# Patient Record
Sex: MALE | Race: WHITE | NOT HISPANIC OR LATINO | Employment: FULL TIME | URBAN - METROPOLITAN AREA
[De-identification: names, ages, dates, MRNs, and addresses within clinical notes are randomized per-mention and may not be internally consistent; named-entity substitution may affect disease eponyms.]

---

## 2018-07-10 ENCOUNTER — HOSPITAL ENCOUNTER (INPATIENT)
Facility: HOSPITAL | Age: 53
LOS: 3 days | Discharge: HOME | DRG: 909 | End: 2018-07-13
Attending: OTOLARYNGOLOGY | Admitting: OTOLARYNGOLOGY
Payer: COMMERCIAL

## 2018-07-10 DIAGNOSIS — G51.0 FACIAL NERVE PARALYSIS: ICD-10-CM

## 2018-07-10 PROCEDURE — 20600001 HC STEP DOWN PRIVATE ROOM

## 2018-07-10 PROCEDURE — 25000003 PHARM REV CODE 250: Performed by: OTOLARYNGOLOGY

## 2018-07-10 RX ORDER — PROCHLORPERAZINE EDISYLATE 5 MG/ML
5 INJECTION INTRAMUSCULAR; INTRAVENOUS EVERY 6 HOURS PRN
Status: DISCONTINUED | OUTPATIENT
Start: 2018-07-10 | End: 2018-07-13 | Stop reason: HOSPADM

## 2018-07-10 RX ORDER — ACETAMINOPHEN 325 MG/1
650 TABLET ORAL EVERY 8 HOURS PRN
Status: DISCONTINUED | OUTPATIENT
Start: 2018-07-10 | End: 2018-07-13 | Stop reason: HOSPADM

## 2018-07-10 RX ORDER — HYDROMORPHONE HYDROCHLORIDE 1 MG/ML
0.5 INJECTION, SOLUTION INTRAMUSCULAR; INTRAVENOUS; SUBCUTANEOUS EVERY 4 HOURS PRN
Status: DISCONTINUED | OUTPATIENT
Start: 2018-07-10 | End: 2018-07-13 | Stop reason: HOSPADM

## 2018-07-10 RX ORDER — BACITRACIN 500 [USP'U]/G
OINTMENT TOPICAL 3 TIMES DAILY
Status: DISCONTINUED | OUTPATIENT
Start: 2018-07-11 | End: 2018-07-13 | Stop reason: HOSPADM

## 2018-07-10 RX ORDER — AMOXICILLIN 250 MG
1 CAPSULE ORAL 2 TIMES DAILY
Status: DISCONTINUED | OUTPATIENT
Start: 2018-07-10 | End: 2018-07-13 | Stop reason: HOSPADM

## 2018-07-10 RX ORDER — HYDROCODONE BITARTRATE AND ACETAMINOPHEN 5; 325 MG/1; MG/1
1 TABLET ORAL EVERY 4 HOURS PRN
Status: DISCONTINUED | OUTPATIENT
Start: 2018-07-10 | End: 2018-07-13 | Stop reason: HOSPADM

## 2018-07-10 RX ORDER — ONDANSETRON 2 MG/ML
4 INJECTION INTRAMUSCULAR; INTRAVENOUS EVERY 8 HOURS PRN
Status: DISCONTINUED | OUTPATIENT
Start: 2018-07-10 | End: 2018-07-13 | Stop reason: HOSPADM

## 2018-07-10 RX ADMIN — SENNOSIDES AND DOCUSATE SODIUM 1 TABLET: 8.6; 5 TABLET ORAL at 11:07

## 2018-07-11 ENCOUNTER — ANESTHESIA EVENT (OUTPATIENT)
Dept: SURGERY | Facility: HOSPITAL | Age: 53
DRG: 909 | End: 2018-07-11
Payer: COMMERCIAL

## 2018-07-11 PROCEDURE — 20600001 HC STEP DOWN PRIVATE ROOM

## 2018-07-11 PROCEDURE — 63600175 PHARM REV CODE 636 W HCPCS: Performed by: OTOLARYNGOLOGY

## 2018-07-11 PROCEDURE — 25000003 PHARM REV CODE 250: Performed by: OTOLARYNGOLOGY

## 2018-07-11 RX ORDER — SODIUM CHLORIDE, SODIUM LACTATE, POTASSIUM CHLORIDE, CALCIUM CHLORIDE 600; 310; 30; 20 MG/100ML; MG/100ML; MG/100ML; MG/100ML
INJECTION, SOLUTION INTRAVENOUS CONTINUOUS
Status: DISCONTINUED | OUTPATIENT
Start: 2018-07-12 | End: 2018-07-13 | Stop reason: HOSPADM

## 2018-07-11 RX ADMIN — SENNOSIDES AND DOCUSATE SODIUM 1 TABLET: 8.6; 5 TABLET ORAL at 09:07

## 2018-07-11 RX ADMIN — HYDROCODONE BITARTRATE AND ACETAMINOPHEN 1 TABLET: 5; 325 TABLET ORAL at 06:07

## 2018-07-11 RX ADMIN — HYDROMORPHONE HYDROCHLORIDE 0.5 MG: 1 INJECTION, SOLUTION INTRAMUSCULAR; INTRAVENOUS; SUBCUTANEOUS at 07:07

## 2018-07-11 RX ADMIN — HYDROMORPHONE HYDROCHLORIDE 0.5 MG: 1 INJECTION, SOLUTION INTRAMUSCULAR; INTRAVENOUS; SUBCUTANEOUS at 09:07

## 2018-07-11 RX ADMIN — BACITRACIN: 500 OINTMENT TOPICAL at 09:07

## 2018-07-11 RX ADMIN — HYPROMELLOSE 2910 2 DROP: 5 SOLUTION OPHTHALMIC at 08:07

## 2018-07-11 RX ADMIN — BACITRACIN: 500 OINTMENT TOPICAL at 02:07

## 2018-07-11 RX ADMIN — HYDROCODONE BITARTRATE AND ACETAMINOPHEN 1 TABLET: 5; 325 TABLET ORAL at 02:07

## 2018-07-11 RX ADMIN — HYDROCODONE BITARTRATE AND ACETAMINOPHEN 1 TABLET: 5; 325 TABLET ORAL at 09:07

## 2018-07-11 RX ADMIN — ACETAMINOPHEN 650 MG: 325 TABLET ORAL at 11:07

## 2018-07-11 RX ADMIN — HYPROMELLOSE 2910 2 DROP: 5 SOLUTION OPHTHALMIC at 06:07

## 2018-07-11 NOTE — HPI
53 y/o M w/ h/o GERD and a parotid mass presents as a transfer for ENT evaluation and direct admit. 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 Dr. Moreno consulted for possible reanimation/reanastomosis of the nerve. Patient currently has no complaints and denies any symptoms other than incisional pain and inability to close his eye.

## 2018-07-11 NOTE — ANESTHESIA PREPROCEDURE EVALUATION
[facility]  Anesthesia Pre-Operative Evaluation         Patient Name: Ruben Covington  YOB: 1965  MRN: [id number]    SUBJECTIVE:     Pre-operative evaluation for Procedure(s) (LRB):  MASTOIDECTOMY (Left)  EXPLORATION, NERVE (Left)     07/11/2018    Ruben Covington is a 52 y.o. male w/ a significant PMHx of GERD and a parotid mass presents as a transfer for ENT evaluation after undergoing a left superficial parotidectomy complicated by a facial nerve injury. Active smoker.     Of note, pt reports significant PONV following yesterday's surgery at OSH. Pt currently with very limited mouth opening and neck extension due to significant pain and swelling at left cervical surgical site.  Possible difficult intubation.     Patient now presents for the above procedure(s).      LDA: None documented.    Prev airway: None documented.    Drips: None documented.    Patient Active Problem List   Diagnosis    Facial nerve paralysis       Review of patient's allergies indicates:  No Known Allergies      Current Facility-Administered Medications:     acetaminophen tablet 650 mg, 650 mg, Oral, Q8H PRN, Rhys Sandoval MD, 650 mg at 07/11/18 1132    bacitracin ointment, , Topical (Top), TID, Rhys Sandoval MD    HYDROcodone-acetaminophen 5-325 mg per tablet 1 tablet, 1 tablet, Oral, Q4H PRN, Rhys Sandoval MD, 1 tablet at 07/11/18 0934    HYDROmorphone injection 0.5 mg, 0.5 mg, Intravenous, Q4H PRN, Rhys Sandoval MD, 0.5 mg at 07/11/18 0757    ondansetron injection 4 mg, 4 mg, Intravenous, Q8H PRN, Rhys Sandoval MD    prochlorperazine injection Soln 5 mg, 5 mg, Intravenous, Q6H PRN, Rhys Sandoval MD    senna-docusate 8.6-50 mg per tablet 1 tablet, 1 tablet, Oral, BID, Rhys Sandoval MD, 1 tablet at 07/11/18 0935    white petrolatum-mineral oil (SYSTANE NIGHTTIME) ophthalmic ointment, , Left Eye, QHS, Rhys Sandoval MD    No current  facility-administered medications on file prior to encounter.      No current outpatient prescriptions on file prior to encounter.       No past surgical history on file.    Social History     Social History    Marital status: Unknown     Spouse name: N/A    Number of children: N/A    Years of education: N/A     Occupational History    Not on file.     Social History Main Topics    Smoking status: Not on file    Smokeless tobacco: Not on file    Alcohol use Not on file    Drug use: Unknown    Sexual activity: Not on file     Other Topics Concern    Not on file     Social History Narrative    No narrative on file       OBJECTIVE:     Vital Signs Range (Last 24H):  Temp:  [36.8 °C (98.2 °F)-37.1 °C (98.8 °F)]   Pulse:  [81-97]   Resp:  [16-18]   BP: (113-152)/(69-78)   SpO2:  [93 %-97 %]       Significant Labs:  No results found for: WBC, HGB, HCT, PLT, CHOL, TRIG, HDL, LDLDIRECT, ALT, AST, NA, K, CL, CREATININE, BUN, CO2, TSH, PSA, INR, GLUF, HGBA1C, MICROALBUR    Diagnostic Studies: No relevant studies.    EKG: No recent studies available.    2D ECHO:  No results found for this or any previous visit.         Anesthesia Evaluation    I have reviewed the Patient Summary Reports.    I have reviewed the Nursing Notes.   I have reviewed the Medications.     Review of Systems  Anesthesia Hx:  Hx of Anesthetic complications (PONV)  History of prior surgery of interest to airway management or planning: facial plastic/reconstructive. Previous anesthesia: General Denies Family Hx of Anesthesia complications.  Personal Hx of Anesthesia complications, Post-Operative Nausea/Vomiting   Social:  Smoker    Hematology/Oncology:  Hematology Normal        EENT/Dental:EENT/Dental Normal   Cardiovascular:  Cardiovascular Normal Exercise tolerance: good     Pulmonary:  Pulmonary Normal    Renal/:  Renal/ Normal     Hepatic/GI:   GERD, well controlled    Musculoskeletal:  Musculoskeletal Normal    Endocrine:  Endocrine  Normal    Psych:  Psychiatric Normal           Physical Exam  General:  Well nourished    Airway/Jaw/Neck:  Airway Findings: (Pt unable to open mouth >2 cm 2/2 to neck swelling and pain s/p surgical cervical mass removal 7/10/18 at OSH. Unable to assess Mallampati score due to limited mouth opening. ) Mouth Opening: < 3 cm Tongue: Normal  General Airway Assessment: Adult, Possible difficult intubation  Oropharynx Findings:  TM Distance: Normal, at least 6 cm  Jaw/Neck Findings:  Neck ROM: Extension Decreased, Mod.  Neck Findings:  Left neck with sutures and drain in place. Decreased neck extension 2/2 pain and swelling.     Eyes/Ears/Nose:  EYES/EARS/NOSE FINDINGS: Normal   Dental:  Dental Findings: loose tooth    Chest/Lungs:  Chest/Lungs Clear    Heart/Vascular:  Heart Findings: Normal    Abdomen:  Abdomen Findings: Normal    Musculoskeletal:  Musculoskeletal Findings: Normal   Skin:  Skin Findings: Normal    Mental Status:  Mental Status Findings: Normal        Anesthesia Plan  Type of Anesthesia, risks & benefits discussed:  Anesthesia Type:  general  Patient's Preference:   Intra-op Monitoring Plan:   Intra-op Monitoring Plan Comments:   Post Op Pain Control Plan: multimodal analgesia, IV/PO Opioids PRN and per primary service following discharge from PACU  Post Op Pain Control Plan Comments:   Induction:   IV  Beta Blocker:  Patient is not currently on a Beta-Blocker (No further documentation required).       Informed Consent: Patient understands risks and agrees with Anesthesia plan.  Questions answered. Anesthesia consent signed with patient.  ASA Score: 2     Day of Surgery Review of History & Physical:            Ready For Surgery From Anesthesia Perspective.

## 2018-07-11 NOTE — H&P
[facility]  Otorhinolaryngology-Head & Neck Surgery  History & Physical    Patient Name: Ruben Covington  MRN: [id number]  Admission Date: 7/10/2018  Attending Physician: Avelino Moreno MD   Primary Care Provider: Primary Doctor No    Patient information was obtained from patient and spouse/SO.     Subjective:     Chief Complaint/Reason for Admission: facial paralysis    History of Present Illness: 53 y/o M w/ h/o GERD and a parotid mass presents as a transfer for ENT evaluation and direct admit. 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 Dr. Moreno consulted for possible reanimation/reanastomosis of the nerve. Patient currently has no complaints and denies any symptoms other than incisional pain and inability to close his eye.     Medications:  Continuous Infusions:  Scheduled Meds:   [START ON 7/11/2018] bacitracin   Topical (Top) TID    senna-docusate 8.6-50 mg  1 tablet Oral BID    white petrolatum-mineral oil   Left Eye QHS     PRN Meds:acetaminophen, HYDROcodone-acetaminophen, HYDROmorphone, ondansetron, prochlorperazine     No current facility-administered medications on file prior to encounter.      No current outpatient prescriptions on file prior to encounter.       Review of patient's allergies indicates:  No Known Allergies    No past medical history on file.  No past surgical history on file.  Family History     None        Social History Main Topics    Smoking status: Not on file    Smokeless tobacco: Not on file    Alcohol use Not on file    Drug use: Unknown    Sexual activity: Not on file     Review of Systems   Constitutional: Negative for chills and fever.   HENT: Negative for congestion, dental problem, ear pain and facial swelling.    Eyes: Negative for photophobia, pain and visual disturbance.   Respiratory: Negative for cough and shortness of breath.     Neurological: Positive for facial asymmetry. Negative for speech difficulty.   All other systems reviewed and are negative.    Objective:     Vital Signs (Most Recent):  Temp: 98.5 °F (36.9 °C) (07/10/18 2136)  Pulse: 89 (07/10/18 2136)  Resp: 17 (07/10/18 2136)  BP: (!) 144/78 (07/10/18 2136)  SpO2: 95 % (07/10/18 2136) Vital Signs (24h Range):  Temp:  [98.5 °F (36.9 °C)-98.8 °F (37.1 °C)] 98.5 °F (36.9 °C)  Pulse:  [89-91] 89  Resp:  [16-17] 17  SpO2:  [95 %-96 %] 95 %  BP: (135-144)/(77-78) 144/78     Weight: 85.3 kg (188 lb)  Body mass index is 29.44 kg/m².         Physical Exam  NAD  AT/NC, HB I/VI right HB VI/VI left  EOMI OU. Incomplete closure OS, no injection  Parotidectomy incision c/d/i w/ KATE in place, not holding suction  MMM, anterior tongue mobile, FOM soft  Neck soft, not TTP, normal ROM    Significant Labs:  CBC: No results for input(s): WBC, RBC, HGB, HCT, PLT, MCV, MCH, MCHC in the last 168 hours.  CMP: No results for input(s): GLU, CALCIUM, ALBUMIN, PROT, NA, K, CO2, CL, BUN, CREATININE, ALKPHOS, ALT, AST, BILITOT in the last 168 hours.    Significant Diagnostics:  None    Assessment/Plan:     Facial nerve paralysis    51 y/o M h/o GERD and parotid mass with iatrogenic left facial nerve injury on 7/10/18. Currently AFVSS, NAD. HB VI on left.    - Admit to ENT  - Eye precautions qhs  - ointment OS qhs  - Pain control PRN  - ADAT  - Activity as tolerated  - Bacitracin to incisions and drain site  - If drain not holding suction will use intermittent wall suction  - drain care  - Plan for nerve reconstruction on Thursday w/ María          VTE Risk Mitigation         Ordered     Place ANGEL hose  Until discontinued      07/10/18 2234     Place sequential compression device  Until discontinued      07/10/18 2234     IP VTE LOW RISK PATIENT  Once      07/10/18 2234          Rhys Sandoval MD  Otorhinolaryngology-Head & Neck Surgery  [facility]

## 2018-07-11 NOTE — ASSESSMENT & PLAN NOTE
51 y/o M h/o GERD and parotid mass with iatrogenic left facial nerve injury on 7/10/18. Currently AFVSS, NAD. HB VI on left.    - Admit to ENT  - Eye precautions qhs  - ointment OS qhs  - Pain control PRN  - ADAT  - Activity as tolerated  - Bacitracin to incisions and drain site  - If drain not holding suction will use intermittent wall suction  - drain care  - Plan for nerve reconstruction on Thursday w/ María

## 2018-07-11 NOTE — PLAN OF CARE
Problem: Patient Care Overview  Goal: Plan of Care Review  Outcome: Ongoing (interventions implemented as appropriate)  Plan of care reivewed, patient verbalizes understanding. AAOx4. VSS throughout shift. Pain managed with PRN meds throughout shift. Patient on regular diet. KATE drain intact, bulb not holding suction, MD aware. Patient remains free of falls/injuries. No acute distress at this time. Will continue to monitor.

## 2018-07-11 NOTE — SUBJECTIVE & OBJECTIVE
Interval History: NAEON. Wearing eye patch at night, did not get lacrilube ointment.    Medications:  Continuous Infusions:  Scheduled Meds:   bacitracin   Topical (Top) TID    senna-docusate 8.6-50 mg  1 tablet Oral BID    white petrolatum-mineral oil   Left Eye QHS     PRN Meds:acetaminophen, HYDROcodone-acetaminophen, HYDROmorphone, ondansetron, prochlorperazine     Review of patient's allergies indicates:  No Known Allergies  Objective:     Vital Signs (24h Range):  Temp:  [98.2 °F (36.8 °C)-98.8 °F (37.1 °C)] 98.7 °F (37.1 °C)  Pulse:  [81-97] 81  Resp:  [16-18] 16  SpO2:  [93 %-97 %] 95 %  BP: (113-144)/(69-78) 128/70        Lines/Drains/Airways     Drain                 Closed/Suction Drain 07/10/18 Left Neck Bulb 1 day                Physical Exam  HB VI/VI on left. HB I/VI on right  +Bell's phenomenon on left  KATE in place in left neck, not holding suction; serosanguinous drainage  Left preauricular incision c/d/i    Significant Labs:  None    Significant Diagnostics:  None

## 2018-07-11 NOTE — PROGRESS NOTES
[facility]  Otorhinolaryngology-Head & Neck Surgery  Progress Note    Subjective:     Post-Op Info:  * No surgery found *      Hospital Day: 2     Interval History: NAEON. Wearing eye patch at night, did not get lacrilube ointment.    Medications:  Continuous Infusions:  Scheduled Meds:   bacitracin   Topical (Top) TID    senna-docusate 8.6-50 mg  1 tablet Oral BID    white petrolatum-mineral oil   Left Eye QHS     PRN Meds:acetaminophen, HYDROcodone-acetaminophen, HYDROmorphone, ondansetron, prochlorperazine     Review of patient's allergies indicates:  No Known Allergies  Objective:     Vital Signs (24h Range):  Temp:  [98.2 °F (36.8 °C)-98.8 °F (37.1 °C)] 98.7 °F (37.1 °C)  Pulse:  [81-97] 81  Resp:  [16-18] 16  SpO2:  [93 %-97 %] 95 %  BP: (113-144)/(69-78) 128/70        Lines/Drains/Airways     Drain                 Closed/Suction Drain 07/10/18 Left Neck Bulb 1 day                Physical Exam  HB VI/VI on left. HB I/VI on right  +Bell's phenomenon on left  KATE in place in left neck, not holding suction; serosanguinous drainage  Left preauricular incision c/d/i    Significant Labs:  None    Significant Diagnostics:  None    Assessment/Plan:     Facial nerve paralysis    51 y/o M h/o GERD and parotid mass with iatrogenic left facial nerve injury on 7/10/18. Currently AFVSS, NAD. HB VI on left.    - Eye precautions - lacrilube at night, artificial ears QID  - Pain control PRN  - ADAT  - Activity as tolerated  - Bacitracin to incisions and drain site  - If drain not holding suction will use intermittent wall suction  - drain care  - Plan for nerve reconstruction on Thursday w/ María - will add on and get consents today.            Tavia Slaughter MD  Otorhinolaryngology-Head & Neck Surgery  [facility]

## 2018-07-11 NOTE — PROGRESS NOTES
Paged on call MD x3 about pharmacy unable to fill ointment for patient's eye. No page returned. Will continue to monitor.

## 2018-07-11 NOTE — PLAN OF CARE
Problem: Patient Care Overview  Goal: Plan of Care Review  Pt aware NPO past midnight.     Problem: Fall Risk (Adult)  Goal: Identify Related Risk Factors and Signs and Symptoms  Related risk factors and signs and symptoms are identified upon initiation of Human Response Clinical Practice Guideline (CPG)   No falls during this shift.

## 2018-07-11 NOTE — ASSESSMENT & PLAN NOTE
53 y/o M h/o GERD and parotid mass with iatrogenic left facial nerve injury on 7/10/18. Currently AFVSS, NAD. HB VI on left.    - Eye precautions - lacrilube at night, artificial ears QID  - Pain control PRN  - ADAT  - Activity as tolerated  - Bacitracin to incisions and drain site  - If drain not holding suction will use intermittent wall suction  - drain care  - Plan for nerve reconstruction on Thursday w/ María - will add on and get consents today.

## 2018-07-11 NOTE — SUBJECTIVE & OBJECTIVE
Medications:  Continuous Infusions:  Scheduled Meds:   [START ON 7/11/2018] bacitracin   Topical (Top) TID    senna-docusate 8.6-50 mg  1 tablet Oral BID    white petrolatum-mineral oil   Left Eye QHS     PRN Meds:acetaminophen, HYDROcodone-acetaminophen, HYDROmorphone, ondansetron, prochlorperazine     No current facility-administered medications on file prior to encounter.      No current outpatient prescriptions on file prior to encounter.       Review of patient's allergies indicates:  No Known Allergies    No past medical history on file.  No past surgical history on file.  Family History     None        Social History Main Topics    Smoking status: Not on file    Smokeless tobacco: Not on file    Alcohol use Not on file    Drug use: Unknown    Sexual activity: Not on file     Review of Systems   Constitutional: Negative for chills and fever.   HENT: Negative for congestion, dental problem, ear pain and facial swelling.    Eyes: Negative for photophobia, pain and visual disturbance.   Respiratory: Negative for cough and shortness of breath.    Neurological: Positive for facial asymmetry. Negative for speech difficulty.   All other systems reviewed and are negative.    Objective:     Vital Signs (Most Recent):  Temp: 98.5 °F (36.9 °C) (07/10/18 2136)  Pulse: 89 (07/10/18 2136)  Resp: 17 (07/10/18 2136)  BP: (!) 144/78 (07/10/18 2136)  SpO2: 95 % (07/10/18 2136) Vital Signs (24h Range):  Temp:  [98.5 °F (36.9 °C)-98.8 °F (37.1 °C)] 98.5 °F (36.9 °C)  Pulse:  [89-91] 89  Resp:  [16-17] 17  SpO2:  [95 %-96 %] 95 %  BP: (135-144)/(77-78) 144/78     Weight: 85.3 kg (188 lb)  Body mass index is 29.44 kg/m².         Physical Exam  NAD  AT/NC, HB I/VI right HB VI/VI left  EOMI OU. Incomplete closure OS, no injection  Parotidectomy incision c/d/i w/ KATE in place, not holding suction  MMM, anterior tongue mobile, FOM soft  Neck soft, not TTP, normal ROM    Significant Labs:  CBC: No results for input(s): WBC, RBC,  HGB, HCT, PLT, MCV, MCH, MCHC in the last 168 hours.  CMP: No results for input(s): GLU, CALCIUM, ALBUMIN, PROT, NA, K, CO2, CL, BUN, CREATININE, ALKPHOS, ALT, AST, BILITOT in the last 168 hours.    Significant Diagnostics:  None

## 2018-07-11 NOTE — PLAN OF CARE
Patient is a 52 year old male admitted in transfer from [facility] in [place] 7/10/2018 with Fascial Nerve Injury/Paralysis.  Patient is expected to go to OR and discharge home with no needs +/- 7/13/2018.      Patient lives in a one story home with his wife, Jenifer, who will drive him home, care for him and obtain anything he needs after discharge.  Patient alone at the time of visit, states his PCP is Dr Knapp in [place], [place].  This is an ENT MD, attempt to call wife for clarification with no answer, will follow up.  Patient given [facility] with understanding verbalized.  Will continue to follow for needs.    PCP      [facility] [address] [place] [address], [place] - [address]  [facility] [address]  [place] [address]  Phone: [phone] Fax: [phone]      Extended Emergency Contact Information  Primary Emergency Contact: Jenifer Covington  Address: [address] [place] [address] [facility]  Home Phone: [phone]  Mobile Phone: [phone]  Relation: Spouse       07/11/18 1045   Discharge Assessment   Assessment Type Discharge Planning Assessment   Confirmed/corrected address and phone number on facesheet? Yes   Assessment information obtained from? Patient   Expected Length of Stay (days) 4   Communicated expected length of stay with patient/caregiver yes   Prior to hospitilization cognitive status: Alert/Oriented   Prior to hospitalization functional status: Independent   Current cognitive status: Alert/Oriented   Current Functional Status: Independent   Lives With spouse   Able to Return to Prior Arrangements yes   Is patient able to care for self after discharge? Yes   Who are your caregiver(s) and their phone number(s)? wife   Patient's perception of discharge disposition home or selfcare   Equipment Currently Used at Home none   Do you have any problems affording any of your prescribed medications? No   Is the patient taking medications as prescribed? yes    Does the patient have transportation home? Yes   Transportation Available family or friend will provide   Discharge Plan A Home with family   Discharge Plan B Home with family;Home Health   Patient/Family In Agreement With Plan yes

## 2018-07-12 ENCOUNTER — ANESTHESIA (OUTPATIENT)
Dept: SURGERY | Facility: HOSPITAL | Age: 53
DRG: 909 | End: 2018-07-12
Payer: COMMERCIAL

## 2018-07-12 ENCOUNTER — SURGERY (OUTPATIENT)
Age: 53
End: 2018-07-12

## 2018-07-12 PROCEDURE — 63600175 PHARM REV CODE 636 W HCPCS: Performed by: OTOLARYNGOLOGY

## 2018-07-12 PROCEDURE — 67912 CORRECTION EYELID W/IMPLANT: CPT | Mod: LT,,, | Performed by: OTOLARYNGOLOGY

## 2018-07-12 PROCEDURE — 37000009 HC ANESTHESIA EA ADD 15 MINS: Performed by: OTOLARYNGOLOGY

## 2018-07-12 PROCEDURE — 25000003 PHARM REV CODE 250: Performed by: OTOLARYNGOLOGY

## 2018-07-12 PROCEDURE — 36000708 HC OR TIME LEV III 1ST 15 MIN: Performed by: OTOLARYNGOLOGY

## 2018-07-12 PROCEDURE — 64864 REPAIR OF FACIAL NERVE: CPT | Mod: ,,, | Performed by: OTOLARYNGOLOGY

## 2018-07-12 PROCEDURE — 71000039 HC RECOVERY, EACH ADD'L HOUR: Performed by: OTOLARYNGOLOGY

## 2018-07-12 PROCEDURE — 37000008 HC ANESTHESIA 1ST 15 MINUTES: Performed by: OTOLARYNGOLOGY

## 2018-07-12 PROCEDURE — D9220A PRA ANESTHESIA: Mod: CRNA,,, | Performed by: NURSE ANESTHETIST, CERTIFIED REGISTERED

## 2018-07-12 PROCEDURE — D9220A PRA ANESTHESIA: Mod: ANES,,, | Performed by: ANESTHESIOLOGY

## 2018-07-12 PROCEDURE — 63600175 PHARM REV CODE 636 W HCPCS: Performed by: NURSE ANESTHETIST, CERTIFIED REGISTERED

## 2018-07-12 PROCEDURE — 64716 REVISION OF CRANIAL NERVE: CPT | Mod: 59,,, | Performed by: OTOLARYNGOLOGY

## 2018-07-12 PROCEDURE — 64910 NERVE REPAIR W/ALLOGRAFT: CPT | Mod: 59,,, | Performed by: OTOLARYNGOLOGY

## 2018-07-12 PROCEDURE — 27100025 HC TUBING, SET FLUID WARMER: Performed by: NURSE ANESTHETIST, CERTIFIED REGISTERED

## 2018-07-12 PROCEDURE — 36000709 HC OR TIME LEV III EA ADD 15 MIN: Performed by: OTOLARYNGOLOGY

## 2018-07-12 PROCEDURE — 25000003 PHARM REV CODE 250: Performed by: NURSE ANESTHETIST, CERTIFIED REGISTERED

## 2018-07-12 PROCEDURE — 27201423 OPTIME MED/SURG SUP & DEVICES STERILE SUPPLY: Performed by: OTOLARYNGOLOGY

## 2018-07-12 PROCEDURE — C1729 CATH, DRAINAGE: HCPCS | Performed by: OTOLARYNGOLOGY

## 2018-07-12 PROCEDURE — 27800903 OPTIME MED/SURG SUP & DEVICES OTHER IMPLANTS: Performed by: OTOLARYNGOLOGY

## 2018-07-12 PROCEDURE — 71000033 HC RECOVERY, INTIAL HOUR: Performed by: OTOLARYNGOLOGY

## 2018-07-12 PROCEDURE — 00UM0KZ SUPPLEMENT FACIAL NERVE WITH NONAUTOLOGOUS TISSUE SUBSTITUTE, OPEN APPROACH: ICD-10-PCS | Performed by: OTOLARYNGOLOGY

## 2018-07-12 PROCEDURE — 94761 N-INVAS EAR/PLS OXIMETRY MLT: CPT

## 2018-07-12 PROCEDURE — 08UP0JZ SUPPLEMENT LEFT UPPER EYELID WITH SYNTHETIC SUBSTITUTE, OPEN APPROACH: ICD-10-PCS | Performed by: OTOLARYNGOLOGY

## 2018-07-12 PROCEDURE — 20600001 HC STEP DOWN PRIVATE ROOM

## 2018-07-12 DEVICE — IMPLANTABLE DEVICE: Type: IMPLANTABLE DEVICE | Site: EYE | Status: FUNCTIONAL

## 2018-07-12 DEVICE — IMPLANTABLE DEVICE: Type: IMPLANTABLE DEVICE | Site: NECK | Status: FUNCTIONAL

## 2018-07-12 DEVICE — CONNECTOR NRV AXOGUARD 3X15MM: Type: IMPLANTABLE DEVICE | Site: NECK | Status: FUNCTIONAL

## 2018-07-12 RX ORDER — BACITRACIN 500 [USP'U]/G
OINTMENT TOPICAL 3 TIMES DAILY
Status: CANCELLED | OUTPATIENT
Start: 2018-07-12

## 2018-07-12 RX ORDER — MIDAZOLAM HYDROCHLORIDE 1 MG/ML
INJECTION, SOLUTION INTRAMUSCULAR; INTRAVENOUS
Status: DISCONTINUED | OUTPATIENT
Start: 2018-07-12 | End: 2018-07-12

## 2018-07-12 RX ORDER — KETAMINE HCL IN 0.9 % NACL 50 MG/5 ML
SYRINGE (ML) INTRAVENOUS
Status: DISCONTINUED | OUTPATIENT
Start: 2018-07-12 | End: 2018-07-12

## 2018-07-12 RX ORDER — FENTANYL CITRATE 50 UG/ML
INJECTION, SOLUTION INTRAMUSCULAR; INTRAVENOUS
Status: DISCONTINUED | OUTPATIENT
Start: 2018-07-12 | End: 2018-07-12

## 2018-07-12 RX ORDER — SUCCINYLCHOLINE CHLORIDE 20 MG/ML
INJECTION INTRAMUSCULAR; INTRAVENOUS
Status: DISCONTINUED | OUTPATIENT
Start: 2018-07-12 | End: 2018-07-12

## 2018-07-12 RX ORDER — LIDOCAINE HYDROCHLORIDE AND EPINEPHRINE 10; 10 MG/ML; UG/ML
INJECTION, SOLUTION INFILTRATION; PERINEURAL
Status: DISCONTINUED | OUTPATIENT
Start: 2018-07-12 | End: 2018-07-12 | Stop reason: HOSPADM

## 2018-07-12 RX ORDER — ONDANSETRON 2 MG/ML
4 INJECTION INTRAMUSCULAR; INTRAVENOUS DAILY PRN
Status: DISCONTINUED | OUTPATIENT
Start: 2018-07-12 | End: 2018-07-12 | Stop reason: HOSPADM

## 2018-07-12 RX ORDER — GLYCOPYRROLATE 0.2 MG/ML
INJECTION INTRAMUSCULAR; INTRAVENOUS
Status: DISCONTINUED | OUTPATIENT
Start: 2018-07-12 | End: 2018-07-12

## 2018-07-12 RX ORDER — CEFAZOLIN SODIUM 1 G/3ML
INJECTION, POWDER, FOR SOLUTION INTRAMUSCULAR; INTRAVENOUS
Status: DISCONTINUED | OUTPATIENT
Start: 2018-07-12 | End: 2018-07-12

## 2018-07-12 RX ORDER — DEXAMETHASONE SODIUM PHOSPHATE 4 MG/ML
INJECTION, SOLUTION INTRA-ARTICULAR; INTRALESIONAL; INTRAMUSCULAR; INTRAVENOUS; SOFT TISSUE
Status: DISCONTINUED | OUTPATIENT
Start: 2018-07-12 | End: 2018-07-12

## 2018-07-12 RX ORDER — SODIUM CHLORIDE 9 MG/ML
INJECTION, SOLUTION INTRAVENOUS CONTINUOUS PRN
Status: DISCONTINUED | OUTPATIENT
Start: 2018-07-12 | End: 2018-07-12

## 2018-07-12 RX ORDER — ACETAMINOPHEN 10 MG/ML
INJECTION, SOLUTION INTRAVENOUS
Status: DISCONTINUED | OUTPATIENT
Start: 2018-07-12 | End: 2018-07-12

## 2018-07-12 RX ORDER — HEPARIN SODIUM 1000 [USP'U]/ML
INJECTION, SOLUTION INTRAVENOUS; SUBCUTANEOUS
Status: DISCONTINUED | OUTPATIENT
Start: 2018-07-12 | End: 2018-07-12 | Stop reason: HOSPADM

## 2018-07-12 RX ORDER — PROPOFOL 10 MG/ML
VIAL (ML) INTRAVENOUS
Status: DISCONTINUED | OUTPATIENT
Start: 2018-07-12 | End: 2018-07-12

## 2018-07-12 RX ORDER — ESMOLOL HYDROCHLORIDE 10 MG/ML
INJECTION INTRAVENOUS
Status: DISCONTINUED | OUTPATIENT
Start: 2018-07-12 | End: 2018-07-12

## 2018-07-12 RX ORDER — HYDROMORPHONE HYDROCHLORIDE 1 MG/ML
0.2 INJECTION, SOLUTION INTRAMUSCULAR; INTRAVENOUS; SUBCUTANEOUS EVERY 5 MIN PRN
Status: DISCONTINUED | OUTPATIENT
Start: 2018-07-12 | End: 2018-07-12 | Stop reason: HOSPADM

## 2018-07-12 RX ORDER — LIDOCAINE HYDROCHLORIDE 40 MG/ML
INJECTION, SOLUTION RETROBULBAR
Status: DISCONTINUED | OUTPATIENT
Start: 2018-07-12 | End: 2018-07-12 | Stop reason: HOSPADM

## 2018-07-12 RX ORDER — ONDANSETRON 2 MG/ML
INJECTION INTRAMUSCULAR; INTRAVENOUS
Status: DISCONTINUED | OUTPATIENT
Start: 2018-07-12 | End: 2018-07-12

## 2018-07-12 RX ADMIN — Medication 50 MG: at 08:07

## 2018-07-12 RX ADMIN — SUCCINYLCHOLINE CHLORIDE 140 MG: 20 INJECTION, SOLUTION INTRAMUSCULAR; INTRAVENOUS at 07:07

## 2018-07-12 RX ADMIN — GLYCOPYRROLATE 0.2 MG: 0.2 INJECTION, SOLUTION INTRAMUSCULAR; INTRAVENOUS at 07:07

## 2018-07-12 RX ADMIN — MIDAZOLAM HYDROCHLORIDE 2 MG: 1 INJECTION, SOLUTION INTRAMUSCULAR; INTRAVENOUS at 07:07

## 2018-07-12 RX ADMIN — CEFAZOLIN 2 G: 330 INJECTION, POWDER, FOR SOLUTION INTRAMUSCULAR; INTRAVENOUS at 08:07

## 2018-07-12 RX ADMIN — PROPOFOL 100 MG: 10 INJECTION, EMULSION INTRAVENOUS at 07:07

## 2018-07-12 RX ADMIN — FENTANYL CITRATE 100 MCG: 50 INJECTION, SOLUTION INTRAMUSCULAR; INTRAVENOUS at 07:07

## 2018-07-12 RX ADMIN — LIDOCAINE HYDROCHLORIDE 25 ML: 40 INJECTION, SOLUTION RETROBULBAR; TOPICAL at 09:07

## 2018-07-12 RX ADMIN — SODIUM CHLORIDE, SODIUM GLUCONATE, SODIUM ACETATE, POTASSIUM CHLORIDE, MAGNESIUM CHLORIDE, SODIUM PHOSPHATE, DIBASIC, AND POTASSIUM PHOSPHATE: .53; .5; .37; .037; .03; .012; .00082 INJECTION, SOLUTION INTRAVENOUS at 08:07

## 2018-07-12 RX ADMIN — SODIUM CHLORIDE, SODIUM LACTATE, POTASSIUM CHLORIDE, AND CALCIUM CHLORIDE: .6; .31; .03; .02 INJECTION, SOLUTION INTRAVENOUS at 12:07

## 2018-07-12 RX ADMIN — HYDROCODONE BITARTRATE AND ACETAMINOPHEN 1 TABLET: 5; 325 TABLET ORAL at 11:07

## 2018-07-12 RX ADMIN — FENTANYL CITRATE 50 MCG: 50 INJECTION, SOLUTION INTRAMUSCULAR; INTRAVENOUS at 11:07

## 2018-07-12 RX ADMIN — BACITRACIN: 500 OINTMENT TOPICAL at 08:07

## 2018-07-12 RX ADMIN — BACITRACIN: 500 OINTMENT TOPICAL at 03:07

## 2018-07-12 RX ADMIN — HYDROCODONE BITARTRATE AND ACETAMINOPHEN 1 TABLET: 5; 325 TABLET ORAL at 01:07

## 2018-07-12 RX ADMIN — SENNOSIDES AND DOCUSATE SODIUM 1 TABLET: 8.6; 5 TABLET ORAL at 09:07

## 2018-07-12 RX ADMIN — DEXAMETHASONE SODIUM PHOSPHATE 8 MG: 4 INJECTION, SOLUTION INTRAMUSCULAR; INTRAVENOUS at 08:07

## 2018-07-12 RX ADMIN — ONDANSETRON 8 MG: 2 INJECTION INTRAMUSCULAR; INTRAVENOUS at 08:07

## 2018-07-12 RX ADMIN — FENTANYL CITRATE 100 MCG: 50 INJECTION, SOLUTION INTRAMUSCULAR; INTRAVENOUS at 08:07

## 2018-07-12 RX ADMIN — HYPROMELLOSE 2910 2 DROP: 5 SOLUTION OPHTHALMIC at 03:07

## 2018-07-12 RX ADMIN — HYDROMORPHONE HYDROCHLORIDE 0.5 MG: 1 INJECTION, SOLUTION INTRAMUSCULAR; INTRAVENOUS; SUBCUTANEOUS at 03:07

## 2018-07-12 RX ADMIN — SODIUM CHLORIDE: 0.9 INJECTION, SOLUTION INTRAVENOUS at 07:07

## 2018-07-12 RX ADMIN — PROPOFOL 50 MG: 10 INJECTION, EMULSION INTRAVENOUS at 07:07

## 2018-07-12 RX ADMIN — ESMOLOL HYDROCHLORIDE 20 MG: 10 INJECTION INTRAVENOUS at 08:07

## 2018-07-12 RX ADMIN — HEPARIN SODIUM 10000 UNITS: 1000 INJECTION, SOLUTION INTRAVENOUS; SUBCUTANEOUS at 09:07

## 2018-07-12 RX ADMIN — LIDOCAINE HYDROCHLORIDE AND EPINEPHRINE 3 ML: 10; 10 INJECTION, SOLUTION INFILTRATION; PERINEURAL at 11:07

## 2018-07-12 RX ADMIN — ACETAMINOPHEN 1000 MG: 10 INJECTION, SOLUTION INTRAVENOUS at 08:07

## 2018-07-12 RX ADMIN — HYPROMELLOSE 2910 2 DROP: 5 SOLUTION OPHTHALMIC at 08:07

## 2018-07-12 NOTE — SUBJECTIVE & OBJECTIVE
Interval History: NAEON. Using eye precautions. Ready for surgery.    Medications:  Continuous Infusions:   lactated ringers 100 mL/hr at 07/12/18 0025     Scheduled Meds:   artificial tears  2 drop Left Eye QID WAKE    bacitracin   Topical (Top) TID    senna-docusate 8.6-50 mg  1 tablet Oral BID    white petrolatum-mineral oil   Left Eye QHS     PRN Meds:acetaminophen, HYDROcodone-acetaminophen, HYDROmorphone, ondansetron, prochlorperazine     Review of patient's allergies indicates:  No Known Allergies  Objective:     Vital Signs (24h Range):  Temp:  [98 °F (36.7 °C)-98.8 °F (37.1 °C)] 98.8 °F (37.1 °C)  Pulse:  [78-90] 86  Resp:  [16-18] 18  SpO2:  [94 %-97 %] 97 %  BP: (128-159)/(70-92) 148/92        Lines/Drains/Airways     Drain                 Closed/Suction Drain 07/10/18 Left Neck Bulb 2 days          Peripheral Intravenous Line                 Peripheral IV - Single Lumen  Left Hand -- days                Physical Exam    HB VI/VI on left. HB I/VI on right  +Bell's phenomenon on left  KATE in place in left neck, not holding suction; serosanguinous drainage  Left preauricular incision c/d/i    Significant Labs:  None    Significant Diagnostics:  None

## 2018-07-12 NOTE — PROGRESS NOTES
Dr Slaughter stated Dr Patterson spoke to pt and family; they now want to talk to Dr Moreno before going to sx. Dr Slaughter notified Dr Moreno.

## 2018-07-12 NOTE — NURSING TRANSFER
Nursing Transfer Note      7/12/2018     Transfer To: [id number]    Transfer via stretcher    Transfer with na    Transported by pct    Medicines sent: none    Chart send with patient: Yes    Notified: jennifer    Patient reassessed at: 7/12/18 5175

## 2018-07-12 NOTE — PROGRESS NOTES
[facility]  Otorhinolaryngology-Head & Neck Surgery  Progress Note    Subjective:     Post-Op Info:  Procedure(s) (LRB):  MASTOIDECTOMY (Left)  EXPLORATION, NERVE (Left)   Day of Surgery  Hospital Day: 3     Interval History: NAEON. Using eye precautions. Ready for surgery.    Medications:  Continuous Infusions:   lactated ringers 100 mL/hr at 07/12/18 0025     Scheduled Meds:   artificial tears  2 drop Left Eye QID WAKE    bacitracin   Topical (Top) TID    senna-docusate 8.6-50 mg  1 tablet Oral BID    white petrolatum-mineral oil   Left Eye QHS     PRN Meds:acetaminophen, HYDROcodone-acetaminophen, HYDROmorphone, ondansetron, prochlorperazine     Review of patient's allergies indicates:  No Known Allergies  Objective:     Vital Signs (24h Range):  Temp:  [98 °F (36.7 °C)-98.8 °F (37.1 °C)] 98.8 °F (37.1 °C)  Pulse:  [78-90] 86  Resp:  [16-18] 18  SpO2:  [94 %-97 %] 97 %  BP: (128-159)/(70-92) 148/92        Lines/Drains/Airways     Drain                 Closed/Suction Drain 07/10/18 Left Neck Bulb 2 days          Peripheral Intravenous Line                 Peripheral IV - Single Lumen  Left Hand -- days                Physical Exam    HB VI/VI on left. HB I/VI on right  +Bell's phenomenon on left  KATE in place in left neck, not holding suction; serosanguinous drainage  Left preauricular incision c/d/i    Significant Labs:  None    Significant Diagnostics:  None    Assessment/Plan:     Facial nerve paralysis    51 y/o M h/o GERD and parotid mass with iatrogenic left facial nerve injury on 7/10/18. Currently AFVSS, NAD. HB VI on left.    - to OR today for facial nerve exploration and grafting with Gloria Patterson and Tiffanie  - Eye precautions - lacrilube at night, artificial ears QID  - Pain control PRN  - ADAT  - Activity as tolerated  - Bacitracin to incisions and drain site  - If drain not holding suction will use intermittent wall suction  - drain care              Tavia Castillo-José Manuel,  MD  Otorhinolaryngology-Head & Neck Surgery  [facility]

## 2018-07-12 NOTE — PROGRESS NOTES
DR Slaughter notified: pt and family want to speak to DR Leonardo bey before going to sx; Dr Slaughter will notify Dr Patterson. Charge nurse Marissa notified for possible delay to OR.

## 2018-07-12 NOTE — ASSESSMENT & PLAN NOTE
51 y/o M h/o GERD and parotid mass with iatrogenic left facial nerve injury on 7/10/18. Currently AFVSS, NAD. HB VI on left.    - to OR today for facial nerve exploration and grafting with Gloria Patterson and Tiffanie  - Eye precautions - lacrilube at night, artificial ears QID  - Pain control PRN  - ADAT  - Activity as tolerated  - Bacitracin to incisions and drain site  - If drain not holding suction will use intermittent wall suction  - drain care

## 2018-07-12 NOTE — OP NOTE
DATE OF PROCEDURE:  07/12/2018.    PREOPERATIVE DIAGNOSIS:  Left facial nerve paralysis, status post parotidectomy.    POSTOPERATIVE DIAGNOSIS:  Left facial nerve transection, status post   parotidectomy.    PROCEDURES:  1.  Neuroplasty, left facial nerve.  2.  Left facial neurorrhaphy with utilization of AxoGen 3 cm in length, 2-3 mm   in diameter nerve graft.    SURGEON:  Avelino Moreno M.D.    ASSISTANT:  Tavia Slaughter M.D. (RES).    ANESTHESIA:  General endotracheal.    ESTIMATED BLOOD LOSS:  Minimal.    SPECIMENS:  None.    INDICATIONS FOR PROCEDURE:  Mr. Covington is a 52-year-old gentleman, status post   parotidectomy at an outside institution approximately two days ago.  Concern was   raised that during the parotidectomy, the facial nerve had been transected.  He   was transferred to [facility] for evaluation and for repair of this   nerve.  Upon evaluation, he was noted to have a complete left-sided facial   paralysis.  CT of the temporal bone was essentially unremarkable.  It was   recommended that we proceed to the Operating Room for exploration of the left   facial nerve with possible repair versus nerve graft with possible mastoidectomy   for exposure of the nerve within the mastoid should there be inadequate   proximal length.  It was also recommended that he undergo left platinum weight   placement of the left upper lid in order to facilitate closure of his eye.  The   services of my partners in both Otology, namely Dr. Emeka Patterson, and then   Facial Plastics, Dr. Eduardo Ordoñez, were enlisted to assist with this procedure.    He was apprised of the risks, benefits, and alternatives to surgery.  In spite   of the risks inherent to surgery, he provided informed consent.    PROCEDURE IN DETAIL:  The patient was taken to the Operating Room and placed on   the operating table in the supine position.  General endotracheal anesthesia was   induced by the Anesthesia team.  The operating  table was rotated 180 degrees.    The left neck and face were then prepped and draped in a standard sterile   fashion.  The facial nerve monitor was attached to the left face and calibrated   in a standard fashion.    To begin, the incision was opened and the drain that was in place was removed   and discarded.  The skin flaps were then raised and the parotid bed was exposed.    The main trunk of the facial nerve was then identified at the stylomastoid   foramen.  It was noted to be roughly 1 cm in length.  It had been transected at   this point.  The distal main trunk of the facial nerve just proximal to the pes   anserinus was also identified and marked with a silk suture.  The distal branch   of the facial nerve, namely the upper and lower divisions were then stimulated   utilizing the Prass probe.  They stimulated readily with 1 milliampere of   current.  The nerve edges were then freshened and the silk suture on the distal   aspect of the nerve was cut away.  The nerve graft was then brought into the   field and trimmed to size.  An end-to-end anastomosis between the proximal nerve   and the nerve graft was then carried out via the nerve connector.    Subsequently, an end-to-end anastomosis was carried out between the nerve graft   and the distal nerve.  We were not able to use the connector due to the short   length of the distal nerve.  These anastomoses were accomplished with   interrupted 9-0 nylon suture.  Care was taken to place the sutures perineurally and early   in order to line up all the nerve fascicles.  Once the anastomosis was   complete, a 10-Bolivian Yunier drain was placed in the depths of the wound and the   neck was closed utilizing 3-0 Vicryl and nylon suture.  The patient was then   handed over to Dr. Ordoñez for his portion of the procedure.  There were no   intraoperative complications.  I was present and participated in the entire   procedure as dictated above.          / [id number]  blank(s)        CPH/[id number]  dd: 07/12/2018 17:04:18 (CDT)  td: 07/12/2018 19:14:15 (CDT)  Doc ID   #[id number]  Job ID #[id number]    CC:

## 2018-07-12 NOTE — ANESTHESIA POSTPROCEDURE EVALUATION
"Anesthesia Post Evaluation    Patient: Ruben Covington    Procedure(s) Performed: Procedure(s) (LRB):  EXPLORATION, NERVE, Nerve Graft Repair (Left)  INSERTION, ORBITAL IMPLANT Platinum weight left upper eyelid (Left)    Final Anesthesia Type: general  Patient location during evaluation: PACU  Patient participation: Yes- Able to Participate  Level of consciousness: awake and alert  Post-procedure vital signs: reviewed and stable  Pain management: adequate  Airway patency: patent  PONV status at discharge: No PONV  Anesthetic complications: no      Cardiovascular status: blood pressure returned to baseline  Respiratory status: unassisted  Hydration status: euvolemic  Follow-up not needed.        Visit Vitals  /62   Pulse 76   Temp 36.5 °C (97.7 °F) (Temporal)   Resp 12   Ht 5' 7" (1.702 m)   Wt 85.3 kg (188 lb)   SpO2 (!) 94%   BMI 29.44 kg/m²       Pain/Karl Score: Pain Assessment Performed: Yes (7/12/2018 12:40 PM)  Presence of Pain: denies (7/12/2018 12:40 PM)  Pain Rating Prior to Med Admin: 4 (7/12/2018 11:53 AM)  Pain Rating Post Med Admin: 0 (7/12/2018 12:40 PM)  Karl Score: 10 (7/12/2018 12:40 PM)      "

## 2018-07-12 NOTE — TRANSFER OF CARE
"Anesthesia Transfer of Care Note    Patient: Ruben Covington    Procedure(s) Performed: Procedure(s) (LRB):  EXPLORATION, NERVE, Nerve Graft Repair (Left)  INSERTION, ORBITAL IMPLANT Platinum weight left upper eyelid (Left)    Patient location: PACU    Anesthesia Type: general    Transport from OR: Transported from OR on 2-3 L/min O2 by NC with adequate spontaneous ventilation    Post pain: adequate analgesia    Post assessment: no apparent anesthetic complications and tolerated procedure well    Post vital signs: stable    Level of consciousness: awake    Nausea/Vomiting: no nausea/vomiting    Complications: none    Transfer of care protocol was followed      Last vitals:   Visit Vitals  BP (!) 148/92 (BP Location: Left arm, Patient Position: Lying)   Pulse 86   Temp 37.1 °C (98.8 °F) (Oral)   Resp 18   Ht 5' 7" (1.702 m)   Wt 85.3 kg (188 lb)   SpO2 97%   BMI 29.44 kg/m²     "

## 2018-07-12 NOTE — OP NOTE
DATE OF PROCEDURE:  07/12/2018.    SURGEON:  Eduardo Ordoñez M.D.    ASSISTANT:  Tavia Slaughter M.D. (RES).    ANESTHESIA:  General endotracheal anesthesia.    PROCEDURES PERFORMED:  Repair of lagophthalmos with implant of 1.4 g platinum   weight into the upper eyelid, left.    INDICATIONS FOR PROCEDURE:  This is a patient who was undergoing a parotidectomy   at an outside institution and suffered transection of the main trunk of the   facial nerve at that time with resultant complete facial paralysis.  He presents   now for facial re-animation procedures.    POSTOPERATIVE DIAGNOSIS:  This is a patient who was undergoing a parotidectomy   at an outside institution and suffered transection of the main trunk of the   facial nerve at that time with resultant complete facial paralysis.  He presents   now for facial re-animation procedures.    PROCEDURE IN DETAIL:  After obtaining informed consent, the patient was taken to   the Operating Room in supine position.  General endotracheal anesthesia was   induced without difficulty.  The area was prepped and draped in a standard   sterile fashion.  The wound exploration and primary main trunk nerve repair with   cable graft was performed by Dr. Avelino Moreno.  Please see his note on the   same date on the same patient.  The 1.4 g platinum weight was used by placing a   skin incision in a naturally occurring lid crease located roughly 10 mm above   the lash line.  This was carried down to the subcutaneous plane.  The levator   and Van's muscle were then  from their insertion into the tarsal   plate, creating a pocket.  The weight was implanted and secured with simple   interrupted 6-0 Prolene sutures.  The muscle was then resuspended to the tarsal   strip, thus, completing the pocket and securing the weight in situ.  The skin   edges were then opposed with a running nonlocking 6-0 fast absorbing gut suture.    This concluded the procedure.  The patient was  then rotated back to   Anesthesia, awakened in the Operating Room without difficulty.  There were no   complications and estimated blood loss from the eyelid portion of the procedure   was 1 mL.      TITA  dd: 07/12/2018 11:13:08 (CDT)  td: 07/12/2018 13:01:43 (CDT)  Doc ID   #[id number]  Job ID #[id number]    CC:

## 2018-07-12 NOTE — PLAN OF CARE
Pre op assessment complete. Pt and family would like to meet/talk to Dr Patterson before sx today. Call light in reach. Pt needs site blaise.

## 2018-07-13 VITALS
BODY MASS INDEX: 29.51 KG/M2 | HEART RATE: 92 BPM | RESPIRATION RATE: 18 BRPM | SYSTOLIC BLOOD PRESSURE: 129 MMHG | DIASTOLIC BLOOD PRESSURE: 68 MMHG | WEIGHT: 188 LBS | TEMPERATURE: 97 F | HEIGHT: 67 IN | OXYGEN SATURATION: 96 %

## 2018-07-13 PROCEDURE — 63600175 PHARM REV CODE 636 W HCPCS: Performed by: OTOLARYNGOLOGY

## 2018-07-13 PROCEDURE — 25000003 PHARM REV CODE 250: Performed by: OTOLARYNGOLOGY

## 2018-07-13 RX ORDER — CEPHALEXIN 500 MG/1
500 CAPSULE ORAL EVERY 8 HOURS
Qty: 21 CAPSULE | Refills: 0 | Status: SHIPPED | OUTPATIENT
Start: 2018-07-13 | End: 2018-07-13 | Stop reason: HOSPADM

## 2018-07-13 RX ORDER — BACITRACIN 500 [USP'U]/G
OINTMENT TOPICAL 3 TIMES DAILY
Refills: 0 | COMMUNITY
Start: 2018-07-13

## 2018-07-13 RX ORDER — HYDROCODONE BITARTRATE AND ACETAMINOPHEN 5; 325 MG/1; MG/1
1 TABLET ORAL EVERY 6 HOURS PRN
Qty: 30 TABLET | Refills: 0 | Status: ON HOLD | OUTPATIENT
Start: 2018-07-13 | End: 2018-09-24 | Stop reason: HOSPADM

## 2018-07-13 RX ORDER — HYDROCODONE BITARTRATE AND ACETAMINOPHEN 5; 325 MG/1; MG/1
1 TABLET ORAL EVERY 6 HOURS PRN
Qty: 30 TABLET | Refills: 0 | Status: SHIPPED | OUTPATIENT
Start: 2018-07-13 | End: 2018-07-13

## 2018-07-13 RX ADMIN — HYDROCODONE BITARTRATE AND ACETAMINOPHEN 1 TABLET: 5; 325 TABLET ORAL at 07:07

## 2018-07-13 RX ADMIN — HYPROMELLOSE 2910 2 DROP: 5 SOLUTION OPHTHALMIC at 10:07

## 2018-07-13 RX ADMIN — HYDROMORPHONE HYDROCHLORIDE 0.5 MG: 1 INJECTION, SOLUTION INTRAMUSCULAR; INTRAVENOUS; SUBCUTANEOUS at 10:07

## 2018-07-13 RX ADMIN — BACITRACIN: 500 OINTMENT TOPICAL at 10:07

## 2018-07-13 RX ADMIN — HYDROCODONE BITARTRATE AND ACETAMINOPHEN 1 TABLET: 5; 325 TABLET ORAL at 10:07

## 2018-07-13 RX ADMIN — TOBRAMYCIN AND DEXAMETHASONE: 3; 1 OINTMENT OPHTHALMIC at 10:07

## 2018-07-13 RX ADMIN — SENNOSIDES AND DOCUSATE SODIUM 1 TABLET: 8.6; 5 TABLET ORAL at 10:07

## 2018-07-13 NOTE — ASSESSMENT & PLAN NOTE
51 y/o M h/o GERD and parotid mass with iatrogenic left facial nerve injury on 7/10/18. POD#1 s/p left facial nerve exploration with cadaveric graft placement and left upper eyelid platinum weight placement.    - d/c drain today  - home today  - Eye precautions - lacrilube at night, artificial ears QID  - tobradex to eyelid incision, bacitracin to neck incision  - Activity as tolerated  - d/w Dr. Moreno

## 2018-07-13 NOTE — PROGRESS NOTES
[facility]  Otorhinolaryngology-Head & Neck Surgery  Progress Note    Subjective:     Post-Op Info:  Procedure(s) (LRB):  EXPLORATION, NERVE, Nerve Graft Repair (Left)  INSERTION, ORBITAL IMPLANT Platinum weight left upper eyelid (Left)   1 Day Post-Op  Hospital Day: 4     Interval History: NAEON. Underwent nerve graft and left upper eyelid platinum weight placement yesterday. Closing eye completely now. Trouble sleeping while in hospital.    Medications:  Continuous Infusions:   lactated ringers 100 mL/hr at 07/12/18 0025     Scheduled Meds:   artificial tears  2 drop Left Eye QID WAKE    bacitracin   Topical (Top) TID    senna-docusate 8.6-50 mg  1 tablet Oral BID    tobramycin-dexamethasone 0.3-0.1%   Left Eye TID    white petrolatum-mineral oil   Left Eye QHS     PRN Meds:acetaminophen, HYDROcodone-acetaminophen, HYDROmorphone, ondansetron, prochlorperazine     Review of patient's allergies indicates:  No Known Allergies  Objective:     Vital Signs (24h Range):  Temp:  [97.7 °F (36.5 °C)-99.5 °F (37.5 °C)] 98 °F (36.7 °C)  Pulse:  [74-96] 94  Resp:  [12-18] 16  SpO2:  [90 %-95 %] 94 %  BP: (104-145)/(58-81) 130/79        Lines/Drains/Airways     Drain                 Closed/Suction Drain 07/12/18 1040 Left Neck Bulb 10 Fr. less than 1 day          Peripheral Intravenous Line                 Peripheral IV - Single Lumen  Left Hand -- days         Peripheral IV - Single Lumen 07/12/18 0800 Right Hand less than 1 day                Physical Exam    HB VI/VI on left. HB I/VI on right  Now complete closure of left eye.   Left upper eyelid incision c/d/i  KATE in place in left neck, holding suction; serosanguinous drainage  Left preauricular incision c/d/i    Significant Labs:  None    Significant Diagnostics:  None    Assessment/Plan:     * Facial nerve paralysis    51 y/o M h/o GERD and parotid mass with iatrogenic left facial nerve injury on 7/10/18. POD#1 s/p left facial nerve exploration  with cadaveric graft placement and left upper eyelid platinum weight placement.    - d/c drain today  - home today  - Eye precautions - lacrilube at night, artificial ears QID  - tobradex to eyelid incision, bacitracin to neck incision  - Activity as tolerated  - d/w Dr. Tiffanie Slaughter MD  Otorhinolaryngology-Head & Neck Surgery  [facility]

## 2018-07-13 NOTE — PLAN OF CARE
Patient discharged home today with no needs.  Patient instructed to call ENT clinic for follow up with KATIE Hurtado NP 7/16/2018.       07/13/18 8577   Final Note   Assessment Type Final Discharge Note   Discharge Disposition Home   Right Care Referral Info   Post Acute Recommendation No Care

## 2018-07-13 NOTE — PLAN OF CARE
Problem: Patient Care Overview  Goal: Plan of Care Review  Outcome: Ongoing (interventions implemented as appropriate)  Plan of care reviewed with patient. Verbal understanding received. Patient VSS on RA, afebrile. Patient able to close left eye lid completely. Drops and ointment administered as per MAR. Incision moist (ointment), staples intact. KATE drain with minimal SS fluid. No complaints of pain. No signs or symptoms of distress. Patient tolerating diet. Up ad dean to toilet. Bed low locked. Call light in reach. RN will continue to monitor

## 2018-07-13 NOTE — SUBJECTIVE & OBJECTIVE
Interval History: NAEON. Underwent nerve graft and left upper eyelid platinum weight placement yesterday. Closing eye completely now. Trouble sleeping while in hospital.    Medications:  Continuous Infusions:   lactated ringers 100 mL/hr at 07/12/18 0025     Scheduled Meds:   artificial tears  2 drop Left Eye QID WAKE    bacitracin   Topical (Top) TID    senna-docusate 8.6-50 mg  1 tablet Oral BID    tobramycin-dexamethasone 0.3-0.1%   Left Eye TID    white petrolatum-mineral oil   Left Eye QHS     PRN Meds:acetaminophen, HYDROcodone-acetaminophen, HYDROmorphone, ondansetron, prochlorperazine     Review of patient's allergies indicates:  No Known Allergies  Objective:     Vital Signs (24h Range):  Temp:  [97.7 °F (36.5 °C)-99.5 °F (37.5 °C)] 98 °F (36.7 °C)  Pulse:  [74-96] 94  Resp:  [12-18] 16  SpO2:  [90 %-95 %] 94 %  BP: (104-145)/(58-81) 130/79        Lines/Drains/Airways     Drain                 Closed/Suction Drain 07/12/18 1040 Left Neck Bulb 10 Fr. less than 1 day          Peripheral Intravenous Line                 Peripheral IV - Single Lumen  Left Hand -- days         Peripheral IV - Single Lumen 07/12/18 0800 Right Hand less than 1 day                Physical Exam    HB VI/VI on left. HB I/VI on right  Now complete closure of left eye.   Left upper eyelid incision c/d/i  KATE in place in left neck, holding suction; serosanguinous drainage  Left preauricular incision c/d/i    Significant Labs:  None    Significant Diagnostics:  None

## 2018-07-13 NOTE — NURSING
Discharge instructions and prescriptions given and explained to pt and spouse. Both verbalized understanding. IV removed. Pt tolerated well. Pt left ambulating unit with family and belongings.

## 2018-07-13 NOTE — DISCHARGE SUMMARY
[facility]  Otorhinolaryngology-Head & Neck Surgery  Discharge Summary      Patient Name: Ruben Covington  MRN: [id number]  Admission Date: 7/10/2018  Hospital Length of Stay: 3 days  Discharge Date and Time:  07/13/2018 7:18 AM  Attending Physician: Avelino Moreno MD   Discharging Provider: Tavia Slaughter MD  Primary Care Provider: Primary Doctor No     HPI: Mr. Covington is a 51 y/o M w/ h/o GERD and a parotid mass presents as a transfer for ENT evaluation and direct admit. 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 Dr. Moreno consulted for possible reanimation/reanastomosis of the nerve    Procedure(s) (LRB):  EXPLORATION, NERVE, Nerve Graft Repair (Left)  INSERTION, ORBITAL IMPLANT Platinum weight left upper eyelid (Left)     Hospital Course: On HOD#2 Mr. Covington underwent exploration of left facial nerve with placement of nerve graft, as well as platinum weight placement in left upper eyelid. He tolerated the procedure well. On POD#1 he was tolerating PO, with minimal pain. His KATE output was 7.5 ml overnight, and thus was removed. He was discharged home in good condition    Consults: none    Significant Diagnostic Studies: none    Pending Diagnostic Studies:     None        Final Active Diagnoses:    Diagnosis Date Noted POA    PRINCIPAL PROBLEM:  Facial nerve paralysis [G51.0] 07/10/2018 Yes      Problems Resolved During this Admission:    Diagnosis Date Noted Date Resolved POA      Discharged Condition: good    Disposition: Home or Self Care    Follow Up:  Follow-up Information     Olivia Hurtado NP On 7/16/2018.    Specialty:  Otolaryngology  Why:  for drain removal  Contact information:  [address] [facility] [address]  [phone]                 Patient Instructions:     Diet Adult Regular     Other restrictions (specify):   Order Comments: No heavy lifting or  straining x 2 weeks. Wear eye patch at night.     Notify your health care provider if you experience any of the following:  severe uncontrolled pain     Notify your health care provider if you experience any of the following:  redness, tenderness, or signs of infection (pain, swelling, redness, odor or green/yellow discharge around incision site)     Notify your health care provider if you experience any of the following:  persistent dizziness, light-headedness, or visual disturbances     No dressing needed   Order Comments: Ok to shower, but keep drain site and incisions dry. Strip drain and empty, recording output, three times daily     Activity as tolerated       Medications:  Reconciled Home Medications:      Medication List      START taking these medications    artificial tears 0.5 % ophthalmic solution  Commonly known as:  ISOPTO TEARS  Place 2 drops into the left eye 4 (four) times daily.     bacitracin 500 unit/gram ointment  Apply topically 3 (three) times daily.     HYDROcodone-acetaminophen 5-325 mg per tablet  Commonly known as:  NORCO  Take 1 tablet by mouth every 6 (six) hours as needed.     tobramycin-dexamethasone 0.3-0.1% 0.3-0.1 % Oint  Commonly known as:  TOBRADEX  Place into the left eye 3 (three) times daily. Apply to left eye INCISION     white petrolatum-mineral oil 94-3 % Oint  Place into the left eye every evening. Place in left eye before bed            Tavia Slaughter MD  Otorhinolaryngology-Head & Neck Surgery  [facility]

## 2018-07-26 ENCOUNTER — OFFICE VISIT (OUTPATIENT)
Dept: OTOLARYNGOLOGY | Facility: CLINIC | Age: 53
End: 2018-07-26
Payer: COMMERCIAL

## 2018-07-26 VITALS
DIASTOLIC BLOOD PRESSURE: 73 MMHG | BODY MASS INDEX: 27.86 KG/M2 | SYSTOLIC BLOOD PRESSURE: 124 MMHG | WEIGHT: 177.94 LBS | TEMPERATURE: 99 F | HEART RATE: 90 BPM

## 2018-07-26 DIAGNOSIS — G51.0 FACIAL NERVE PARALYSIS: ICD-10-CM

## 2018-07-26 DIAGNOSIS — L08.9 INFECTED SUPERFICIAL INJURY OF LEFT HAND, INITIAL ENCOUNTER: Primary | ICD-10-CM

## 2018-07-26 DIAGNOSIS — S60.922A INFECTED SUPERFICIAL INJURY OF LEFT HAND, INITIAL ENCOUNTER: Primary | ICD-10-CM

## 2018-07-26 PROCEDURE — 99213 OFFICE O/P EST LOW 20 MIN: CPT | Mod: 25,S,, | Performed by: OTOLARYNGOLOGY

## 2018-07-26 PROCEDURE — 99999 PR PBB SHADOW E&M-EST. PATIENT-LVL III: CPT | Mod: PBBFAC,,, | Performed by: OTOLARYNGOLOGY

## 2018-07-26 PROCEDURE — 3008F BODY MASS INDEX DOCD: CPT | Mod: CPTII,S$GLB,, | Performed by: OTOLARYNGOLOGY

## 2018-07-26 RX ORDER — SULFAMETHOXAZOLE AND TRIMETHOPRIM 800; 160 MG/1; MG/1
1 TABLET ORAL 2 TIMES DAILY
Qty: 20 TABLET | Refills: 0 | Status: ON HOLD | OUTPATIENT
Start: 2018-07-26 | End: 2018-09-24 | Stop reason: CLARIF

## 2018-07-26 NOTE — PROGRESS NOTES
Chief Complaint   Patient presents with    Post-op Evaluation/suture removal, and need left hand looked       HPI   52 y.o. male presents ~ 2 weeks status post nerve graft repair of transected L VII. He reports pain, swelling and purulent drainage from his L hand IV site since discharge.  He reports that he expressed a significant amount of pus from this wound yesterday. The appearance of this lesion has improved over the last 24 hrs.     Review of Systems   Constitutional: Negative for fatigue and unexpected weight change.   HENT: Per HPI.  Eyes: Negative for visual disturbance.   Respiratory: Negative for shortness of breath, hemoptysis   Cardiovascular: Negative for chest pain and palpitations.   Musculoskeletal: Negative for decreased ROM, back pain.   Skin: Negative for rash, sunburn, itching.   Neurological: Negative for dizziness and seizures.   Hematological: Negative for adenopathy. Does not bruise/bleed easily.   Endocrine: Negative for rapid weight loss/weight gain, heat/cold intolerance.     Past Medical History   Patient Active Problem List   Diagnosis    Facial nerve paralysis           Past Surgical History   Past Surgical History:   Procedure Laterality Date    NERVE EXPLORATION Left 7/12/2018    Procedure: EXPLORATION, NERVE, Nerve Graft Repair;  Surgeon: Avelino Moreno MD;  Location: [facility] OR [address] [place] [address];  Service: ENT;  Laterality: Left;  Facial nerve exploration and grafting    ORBITAL IMPLANT Left 7/12/2018    Procedure: INSERTION, ORBITAL IMPLANT Platinum weight left upper eyelid;  Surgeon: Eduardo Ordoñez MD;  Location: [facility] OR [address] [place] [address];  Service: ENT;  Laterality: Left;         Family History   No family history on file.        Social History   .  Social History     Social History    Marital status:      Spouse name: N/A    Number of children: N/A    Years of education: N/A     Occupational History    Not on file.     Social History Main Topics    Smoking status: Current Some  Day Smoker    Smokeless tobacco: Never Used    Alcohol use Not on file    Drug use: Unknown    Sexual activity: Not on file     Other Topics Concern    Not on file     Social History Narrative    No narrative on file         Allergies   Review of patient's allergies indicates:  No Known Allergies        Physical Exam     Vitals:    07/26/18 1116   BP: 124/73   Pulse: 90   Temp: 98.7 °F (37.1 °C)         Body mass index is 27.86 kg/m².      General: AOx3, NAD   Respiratory:  Symmetric chest rise, normal effort   Nose: No gross nasal septal deviation. Inferior Turbinates WNL bilaterally. No septal perforation. No masses/lesions.   Neck: No scars.  No cervical lymphadenopathy, thyromegaly or thyroid nodules.  Normal range of motion.    Face: House Brackmann I on R, VI/VI on L.  Parotid incision c/d/i.  Sutures removed.    L hand with erythematous wound with central necrosis.  No pus noted.     Assessment/Plan  Problem List Items Addressed This Visit        Neuro    Facial nerve paralysis     Doing well after nerve graft.  RTC 1 month.             Orthopedic    Infected superficial injury of left hand - Primary     Will start Bactrim.  If he worsens, will refer to hand surgeon in [place].

## 2018-09-10 ENCOUNTER — OFFICE VISIT (OUTPATIENT)
Dept: OTOLARYNGOLOGY | Facility: CLINIC | Age: 53
End: 2018-09-10
Payer: COMMERCIAL

## 2018-09-10 VITALS
SYSTOLIC BLOOD PRESSURE: 123 MMHG | HEART RATE: 99 BPM | DIASTOLIC BLOOD PRESSURE: 74 MMHG | WEIGHT: 179.88 LBS | BODY MASS INDEX: 28.18 KG/M2

## 2018-09-10 DIAGNOSIS — G51.0 FACIAL NERVE PARALYSIS: Primary | ICD-10-CM

## 2018-09-10 PROCEDURE — 99024 POSTOP FOLLOW-UP VISIT: CPT | Mod: S$GLB,,, | Performed by: OTOLARYNGOLOGY

## 2018-09-10 PROCEDURE — 99999 PR PBB SHADOW E&M-EST. PATIENT-LVL III: CPT | Mod: PBBFAC,,, | Performed by: OTOLARYNGOLOGY

## 2018-09-10 NOTE — ASSESSMENT & PLAN NOTE
Left facial paralysis after parotidectomy at outside facility.  Overall, his facial tone is improving.  His platinum weight is not likely heavy enough to assist with eye closure and appears to be extruding.    I explained that the remainder of his complaints are expected in the wake of facial nerve transection.  I explained that he may recover some tone but that his recovery may not be adequate to allow for eye closure/improved mouth opening.    He was seen with my partner, Dr. Ordoñez, who inserted his platinum weight.  Dr. Ordoñez has recommended revision of his L platinum weight and direct brow lift for brow ptosis.  He is to call to schedule.

## 2018-09-10 NOTE — H&P (VIEW-ONLY)
"Chief Complaint   Patient presents with    Follow-up       HPI   52 y.o. male presents ~ 2 mos status post nerve graft repair of transected L VII and platinum weight to L upper lid.  He reports that he feels that there is "glass" in his L upper lid. He regularly cleans this area with a Q-tip and feels that the Q-tip is getting caught on the edge of the implant.  He also feels that his eye is not closing completely and that his eye is watering.  He states that this problem is interfering with his work.  He also reports difficulty opening his mouth sufficiently wide to eat large bites of food and of L nasal obstruction. Overall, though, he feels that his facial tone has improved.      Review of Systems   Constitutional: Negative for fatigue and unexpected weight change.   HENT: Per HPI.  Eyes: Negative for visual disturbance.   Respiratory: Negative for shortness of breath, hemoptysis   Cardiovascular: Negative for chest pain and palpitations.   Musculoskeletal: Negative for decreased ROM, back pain.   Skin: Negative for rash, sunburn, itching.   Neurological: Negative for dizziness and seizures.   Hematological: Negative for adenopathy. Does not bruise/bleed easily.   Endocrine: Negative for rapid weight loss/weight gain, heat/cold intolerance.     Past Medical History   Patient Active Problem List   Diagnosis    Facial nerve paralysis    Infected superficial injury of left hand           Past Surgical History   Past Surgical History:   Procedure Laterality Date    EXPLORATION, NERVE, Nerve Graft Repair Left 7/12/2018    Performed by Avelino Moreno MD at [facility] OR 2ND FLR    INSERTION, ORBITAL IMPLANT Platinum weight left upper eyelid Left 7/12/2018    Performed by Eduardo Ordoñez MD at [facility] OR 2ND FLR    NERVE EXPLORATION Left 7/12/2018    Procedure: EXPLORATION, NERVE, Nerve Graft Repair;  Surgeon: Avelino Moreno MD;  Location: [facility] OR [address] [place] [address];  Service: ENT;  Laterality: Left;  Facial nerve " exploration and grafting    ORBITAL IMPLANT Left 7/12/2018    Procedure: INSERTION, ORBITAL IMPLANT Platinum weight left upper eyelid;  Surgeon: Eduardo Ordoñez MD;  Location: [facility] OR [address] [place] [address];  Service: ENT;  Laterality: Left;         Family History   History reviewed. No pertinent family history.        Social History   .  Social History     Socioeconomic History    Marital status:      Spouse name: Not on file    Number of children: Not on file    Years of education: Not on file    Highest education level: Not on file   Social Needs    Financial resource strain: Not on file    Food insecurity - worry: Not on file    Food insecurity - inability: Not on file    Transportation needs - medical: Not on file    Transportation needs - non-medical: Not on file   Occupational History    Not on file   Tobacco Use    Smoking status: Current Some Day Smoker    Smokeless tobacco: Never Used   Substance and Sexual Activity    Alcohol use: Not on file    Drug use: Not on file    Sexual activity: Not on file   Other Topics Concern    Not on file   Social History Narrative    Not on file         Allergies   Review of patient's allergies indicates:  No Known Allergies        Physical Exam     Vitals:    09/10/18 1434   BP: 123/74   Pulse: 99         Body mass index is 28.18 kg/m².      General: AOx3, NAD   Respiratory:  Symmetric chest rise, normal effort   Nose: No gross nasal septal deviation. Inferior Turbinates WNL bilaterally. No septal perforation. No masses/lesions.   Neck: No scars.  No cervical lymphadenopathy, thyromegaly or thyroid nodules.  Normal range of motion.    Face: House Brackmann I on R, VI/VI on L.  L facial tone improving.  Parotid incision well-healed.  L upper lid with Prolene sutures extruding.  The weight is covered by a thin layer of skin.     Assessment/Plan  Problem List Items Addressed This Visit        Neuro    Facial nerve paralysis - Primary     Left facial paralysis after  parotidectomy at outside facility.  Overall, his facial tone is improving.  His platinum weight is not likely heavy enough to assist with eye closure and appears to be extruding.    I explained that the remainder of his complaints are expected in the wake of facial nerve transection.  I explained that he may recover some tone but that his recovery may not be adequate to allow for eye closure/improved mouth opening.    He was seen with my partner, Dr. Ordoñez, who inserted his platinum weight.  Dr. Ordoñez has recommended revision of his L platinum weight and direct brow lift for brow ptosis.  He is to call to schedule.

## 2018-09-10 NOTE — PROGRESS NOTES
"Chief Complaint   Patient presents with    Follow-up       HPI   52 y.o. male presents ~ 2 mos status post nerve graft repair of transected L VII and platinum weight to L upper lid.  He reports that he feels that there is "glass" in his L upper lid. He regularly cleans this area with a Q-tip and feels that the Q-tip is getting caught on the edge of the implant.  He also feels that his eye is not closing completely and that his eye is watering.  He states that this problem is interfering with his work.  He also reports difficulty opening his mouth sufficiently wide to eat large bites of food and of L nasal obstruction. Overall, though, he feels that his facial tone has improved.      Review of Systems   Constitutional: Negative for fatigue and unexpected weight change.   HENT: Per HPI.  Eyes: Negative for visual disturbance.   Respiratory: Negative for shortness of breath, hemoptysis   Cardiovascular: Negative for chest pain and palpitations.   Musculoskeletal: Negative for decreased ROM, back pain.   Skin: Negative for rash, sunburn, itching.   Neurological: Negative for dizziness and seizures.   Hematological: Negative for adenopathy. Does not bruise/bleed easily.   Endocrine: Negative for rapid weight loss/weight gain, heat/cold intolerance.     Past Medical History   Patient Active Problem List   Diagnosis    Facial nerve paralysis    Infected superficial injury of left hand           Past Surgical History   Past Surgical History:   Procedure Laterality Date    EXPLORATION, NERVE, Nerve Graft Repair Left 7/12/2018    Performed by Avelino Moreno MD at [facility] OR 2ND FLR    INSERTION, ORBITAL IMPLANT Platinum weight left upper eyelid Left 7/12/2018    Performed by Eduardo Ordoñez MD at [facility] OR 2ND FLR    NERVE EXPLORATION Left 7/12/2018    Procedure: EXPLORATION, NERVE, Nerve Graft Repair;  Surgeon: Avelino Moreno MD;  Location: [facility] OR [address] [place] [address];  Service: ENT;  Laterality: Left;  Facial nerve " exploration and grafting    ORBITAL IMPLANT Left 7/12/2018    Procedure: INSERTION, ORBITAL IMPLANT Platinum weight left upper eyelid;  Surgeon: Eduardo Ordoñez MD;  Location: [facility] OR [address] [place] [address];  Service: ENT;  Laterality: Left;         Family History   History reviewed. No pertinent family history.        Social History   .  Social History     Socioeconomic History    Marital status:      Spouse name: Not on file    Number of children: Not on file    Years of education: Not on file    Highest education level: Not on file   Social Needs    Financial resource strain: Not on file    Food insecurity - worry: Not on file    Food insecurity - inability: Not on file    Transportation needs - medical: Not on file    Transportation needs - non-medical: Not on file   Occupational History    Not on file   Tobacco Use    Smoking status: Current Some Day Smoker    Smokeless tobacco: Never Used   Substance and Sexual Activity    Alcohol use: Not on file    Drug use: Not on file    Sexual activity: Not on file   Other Topics Concern    Not on file   Social History Narrative    Not on file         Allergies   Review of patient's allergies indicates:  No Known Allergies        Physical Exam     Vitals:    09/10/18 1434   BP: 123/74   Pulse: 99         Body mass index is 28.18 kg/m².      General: AOx3, NAD   Respiratory:  Symmetric chest rise, normal effort   Nose: No gross nasal septal deviation. Inferior Turbinates WNL bilaterally. No septal perforation. No masses/lesions.   Neck: No scars.  No cervical lymphadenopathy, thyromegaly or thyroid nodules.  Normal range of motion.    Face: House Brackmann I on R, VI/VI on L.  L facial tone improving.  Parotid incision well-healed.  L upper lid with Prolene sutures extruding.  The weight is covered by a thin layer of skin.     Assessment/Plan  Problem List Items Addressed This Visit        Neuro    Facial nerve paralysis - Primary     Left facial paralysis after  parotidectomy at outside facility.  Overall, his facial tone is improving.  His platinum weight is not likely heavy enough to assist with eye closure and appears to be extruding.    I explained that the remainder of his complaints are expected in the wake of facial nerve transection.  I explained that he may recover some tone but that his recovery may not be adequate to allow for eye closure/improved mouth opening.    He was seen with my partner, Dr. Ordoñez, who inserted his platinum weight.  Dr. Ordoñez has recommended revision of his L platinum weight and direct brow lift for brow ptosis.  He is to call to schedule.

## 2018-09-15 RX ORDER — AMOXICILLIN AND CLAVULANATE POTASSIUM 875; 125 MG/1; MG/1
1 TABLET, FILM COATED ORAL EVERY 12 HOURS
Qty: 10 TABLET | Refills: 0 | Status: ON HOLD | OUTPATIENT
Start: 2018-09-15 | End: 2018-09-24 | Stop reason: HOSPADM

## 2018-09-17 ENCOUNTER — OFFICE VISIT (OUTPATIENT)
Dept: OTOLARYNGOLOGY | Facility: CLINIC | Age: 53
End: 2018-09-17
Payer: COMMERCIAL

## 2018-09-17 ENCOUNTER — TELEPHONE (OUTPATIENT)
Dept: OTOLARYNGOLOGY | Facility: CLINIC | Age: 53
End: 2018-09-17

## 2018-09-17 VITALS
HEART RATE: 88 BPM | BODY MASS INDEX: 27.83 KG/M2 | WEIGHT: 177.69 LBS | SYSTOLIC BLOOD PRESSURE: 112 MMHG | DIASTOLIC BLOOD PRESSURE: 72 MMHG

## 2018-09-17 DIAGNOSIS — G51.0 FACIAL NERVE PARALYSIS: Primary | ICD-10-CM

## 2018-09-17 DIAGNOSIS — H02.234 PARALYTIC LAGOPHTHALMOS OF LEFT UPPER EYELID: ICD-10-CM

## 2018-09-17 DIAGNOSIS — H57.819 BROW PTOSIS: ICD-10-CM

## 2018-09-17 PROCEDURE — 99024 POSTOP FOLLOW-UP VISIT: CPT | Mod: S$GLB,,, | Performed by: OTOLARYNGOLOGY

## 2018-09-17 PROCEDURE — 99999 PR PBB SHADOW E&M-EST. PATIENT-LVL IV: CPT | Mod: PBBFAC,,, | Performed by: OTOLARYNGOLOGY

## 2018-09-17 PROCEDURE — 3008F BODY MASS INDEX DOCD: CPT | Mod: CPTII,S$GLB,, | Performed by: OTOLARYNGOLOGY

## 2018-09-17 NOTE — PROGRESS NOTES
Head & Neck Surgery Follow-Up    Treatment History:  7/10/2018 - 1.4g platinum weight to upper lid OS, facial nerve repair with AxoGen cable graft.    Interval history:  Generally doing well, with the exception of extrusion of his platinum weight. Denies exposure/dry eye symptoms and generally reports he felt the eyelid weight was functioning well, though when pressed would admit to suboptimal closure, particularly in the rain.    Exam:  Eyelid weight extruded (he has it with him, both prolene sutures attached) and incision well-healed  4-5mm lagophthalmos OS  3-4mm brow ptosis OS    A: paralytic lagophthalmos    P: Re-measured for eyelid weight today, will plan on 1.6g weight this time in conjunction with direct browlift. Scheduled for 9/24/2018.

## 2018-09-17 NOTE — TELEPHONE ENCOUNTER
----- Message from Adri Malloy sent at 9/17/2018  8:27 AM CDT -----  Contact: Jenifer patients wife   Needs Advice    Reason for call: Patients wife calling to inform that patient will be there today for appt.         Communication Preference: [phone]

## 2018-09-21 ENCOUNTER — TELEPHONE (OUTPATIENT)
Dept: OTOLARYNGOLOGY | Facility: CLINIC | Age: 53
End: 2018-09-21

## 2018-09-24 ENCOUNTER — HOSPITAL ENCOUNTER (OUTPATIENT)
Facility: HOSPITAL | Age: 53
Discharge: HOME | End: 2018-09-24
Attending: OTOLARYNGOLOGY | Admitting: OTOLARYNGOLOGY
Payer: COMMERCIAL

## 2018-09-24 ENCOUNTER — ANESTHESIA EVENT (OUTPATIENT)
Dept: SURGERY | Facility: HOSPITAL | Age: 53
End: 2018-09-24
Payer: COMMERCIAL

## 2018-09-24 ENCOUNTER — ANESTHESIA (OUTPATIENT)
Dept: SURGERY | Facility: HOSPITAL | Age: 53
End: 2018-09-24
Payer: COMMERCIAL

## 2018-09-24 VITALS
SYSTOLIC BLOOD PRESSURE: 121 MMHG | BODY MASS INDEX: 27.62 KG/M2 | DIASTOLIC BLOOD PRESSURE: 75 MMHG | HEART RATE: 71 BPM | TEMPERATURE: 98 F | WEIGHT: 176 LBS | HEIGHT: 67 IN | OXYGEN SATURATION: 98 % | RESPIRATION RATE: 22 BRPM

## 2018-09-24 DIAGNOSIS — H02.206 LAGOPHTHALMOS OF LEFT EYE: Primary | ICD-10-CM

## 2018-09-24 DIAGNOSIS — H02.234 PARALYTIC LAGOPHTHALMOS OF LEFT UPPER EYELID: ICD-10-CM

## 2018-09-24 DIAGNOSIS — G51.0 FACIAL NERVE PARALYSIS: ICD-10-CM

## 2018-09-24 PROCEDURE — 63600175 PHARM REV CODE 636 W HCPCS: Performed by: NURSE ANESTHETIST, CERTIFIED REGISTERED

## 2018-09-24 PROCEDURE — 63600175 PHARM REV CODE 636 W HCPCS

## 2018-09-24 PROCEDURE — 67912 CORRECTION EYELID W/IMPLANT: CPT | Mod: 78,LT,, | Performed by: OTOLARYNGOLOGY

## 2018-09-24 PROCEDURE — 25000003 PHARM REV CODE 250: Performed by: OTOLARYNGOLOGY

## 2018-09-24 PROCEDURE — D9220A PRA ANESTHESIA: Mod: ANES,,, | Performed by: ANESTHESIOLOGY

## 2018-09-24 PROCEDURE — 25000003 PHARM REV CODE 250: Performed by: NURSE ANESTHETIST, CERTIFIED REGISTERED

## 2018-09-24 PROCEDURE — 37000008 HC ANESTHESIA 1ST 15 MINUTES: Performed by: OTOLARYNGOLOGY

## 2018-09-24 PROCEDURE — 36000709 HC OR TIME LEV III EA ADD 15 MIN: Performed by: OTOLARYNGOLOGY

## 2018-09-24 PROCEDURE — 71000015 HC POSTOP RECOV 1ST HR: Performed by: OTOLARYNGOLOGY

## 2018-09-24 PROCEDURE — 71000044 HC DOSC ROUTINE RECOVERY FIRST HOUR: Performed by: OTOLARYNGOLOGY

## 2018-09-24 PROCEDURE — 27800903 OPTIME MED/SURG SUP & DEVICES OTHER IMPLANTS: Performed by: OTOLARYNGOLOGY

## 2018-09-24 PROCEDURE — D9220A PRA ANESTHESIA: Mod: CRNA,,, | Performed by: NURSE ANESTHETIST, CERTIFIED REGISTERED

## 2018-09-24 PROCEDURE — 37000009 HC ANESTHESIA EA ADD 15 MINS: Performed by: OTOLARYNGOLOGY

## 2018-09-24 PROCEDURE — 36000708 HC OR TIME LEV III 1ST 15 MIN: Performed by: OTOLARYNGOLOGY

## 2018-09-24 DEVICE — IMPLANTABLE DEVICE: Type: IMPLANTABLE DEVICE | Site: EYE | Status: FUNCTIONAL

## 2018-09-24 RX ORDER — MIDAZOLAM HYDROCHLORIDE 1 MG/ML
INJECTION, SOLUTION INTRAMUSCULAR; INTRAVENOUS
Status: DISCONTINUED | OUTPATIENT
Start: 2018-09-24 | End: 2018-09-24

## 2018-09-24 RX ORDER — GLYCOPYRROLATE 0.2 MG/ML
INJECTION INTRAMUSCULAR; INTRAVENOUS
Status: DISCONTINUED | OUTPATIENT
Start: 2018-09-24 | End: 2018-09-24

## 2018-09-24 RX ORDER — ONDANSETRON 4 MG/1
8 TABLET, ORALLY DISINTEGRATING ORAL EVERY 12 HOURS PRN
Qty: 6 TABLET | Refills: 0 | Status: SHIPPED | OUTPATIENT
Start: 2018-09-24

## 2018-09-24 RX ORDER — MEPERIDINE HYDROCHLORIDE 50 MG/ML
12.5 INJECTION INTRAMUSCULAR; INTRAVENOUS; SUBCUTANEOUS ONCE AS NEEDED
Status: DISCONTINUED | OUTPATIENT
Start: 2018-09-24 | End: 2018-09-24 | Stop reason: HOSPADM

## 2018-09-24 RX ORDER — CEFAZOLIN SODIUM 1 G/3ML
2 INJECTION, POWDER, FOR SOLUTION INTRAMUSCULAR; INTRAVENOUS ONCE
Status: COMPLETED | OUTPATIENT
Start: 2018-09-24 | End: 2018-09-24

## 2018-09-24 RX ORDER — AMOXICILLIN 500 MG/1
500 CAPSULE ORAL 3 TIMES DAILY
Qty: 30 CAPSULE | Refills: 0 | Status: SHIPPED | OUTPATIENT
Start: 2018-09-24 | End: 2018-10-04

## 2018-09-24 RX ORDER — LIDOCAINE HCL/PF 100 MG/5ML
SYRINGE (ML) INTRAVENOUS
Status: DISCONTINUED | OUTPATIENT
Start: 2018-09-24 | End: 2018-09-24

## 2018-09-24 RX ORDER — DIPHENHYDRAMINE HYDROCHLORIDE 50 MG/ML
25 INJECTION INTRAMUSCULAR; INTRAVENOUS EVERY 6 HOURS PRN
Status: DISCONTINUED | OUTPATIENT
Start: 2018-09-24 | End: 2018-09-24 | Stop reason: HOSPADM

## 2018-09-24 RX ORDER — PHENYLEPHRINE HYDROCHLORIDE 10 MG/ML
INJECTION INTRAVENOUS
Status: DISCONTINUED | OUTPATIENT
Start: 2018-09-24 | End: 2018-09-24

## 2018-09-24 RX ORDER — ROCURONIUM BROMIDE 10 MG/ML
INJECTION, SOLUTION INTRAVENOUS
Status: DISCONTINUED | OUTPATIENT
Start: 2018-09-24 | End: 2018-09-24

## 2018-09-24 RX ORDER — LIDOCAINE HYDROCHLORIDE AND EPINEPHRINE 10; 10 MG/ML; UG/ML
INJECTION, SOLUTION INFILTRATION; PERINEURAL
Status: DISCONTINUED | OUTPATIENT
Start: 2018-09-24 | End: 2018-09-24 | Stop reason: HOSPADM

## 2018-09-24 RX ORDER — FENTANYL CITRATE 50 UG/ML
INJECTION, SOLUTION INTRAMUSCULAR; INTRAVENOUS
Status: DISCONTINUED | OUTPATIENT
Start: 2018-09-24 | End: 2018-09-24

## 2018-09-24 RX ORDER — HYDROMORPHONE HYDROCHLORIDE 1 MG/ML
0.2 INJECTION, SOLUTION INTRAMUSCULAR; INTRAVENOUS; SUBCUTANEOUS EVERY 5 MIN PRN
Status: DISCONTINUED | OUTPATIENT
Start: 2018-09-24 | End: 2018-09-24 | Stop reason: HOSPADM

## 2018-09-24 RX ORDER — DEXAMETHASONE SODIUM PHOSPHATE 4 MG/ML
INJECTION, SOLUTION INTRA-ARTICULAR; INTRALESIONAL; INTRAMUSCULAR; INTRAVENOUS; SOFT TISSUE
Status: DISCONTINUED | OUTPATIENT
Start: 2018-09-24 | End: 2018-09-24

## 2018-09-24 RX ORDER — ONDANSETRON 2 MG/ML
INJECTION INTRAMUSCULAR; INTRAVENOUS
Status: DISCONTINUED | OUTPATIENT
Start: 2018-09-24 | End: 2018-09-24

## 2018-09-24 RX ORDER — ACETAMINOPHEN 10 MG/ML
INJECTION, SOLUTION INTRAVENOUS
Status: DISCONTINUED | OUTPATIENT
Start: 2018-09-24 | End: 2018-09-24

## 2018-09-24 RX ORDER — HYDROCODONE BITARTRATE AND ACETAMINOPHEN 5; 325 MG/1; MG/1
1 TABLET ORAL EVERY 6 HOURS PRN
Qty: 30 TABLET | Refills: 0 | Status: SHIPPED | OUTPATIENT
Start: 2018-09-24

## 2018-09-24 RX ORDER — HYDROCODONE BITARTRATE AND ACETAMINOPHEN 5; 325 MG/1; MG/1
1 TABLET ORAL ONCE
Status: DISCONTINUED | OUTPATIENT
Start: 2018-09-24 | End: 2018-09-24 | Stop reason: HOSPADM

## 2018-09-24 RX ORDER — PROPOFOL 10 MG/ML
VIAL (ML) INTRAVENOUS
Status: DISCONTINUED | OUTPATIENT
Start: 2018-09-24 | End: 2018-09-24

## 2018-09-24 RX ORDER — SODIUM CHLORIDE 9 MG/ML
INJECTION, SOLUTION INTRAVENOUS CONTINUOUS PRN
Status: DISCONTINUED | OUTPATIENT
Start: 2018-09-24 | End: 2018-09-24

## 2018-09-24 RX ORDER — ONDANSETRON 2 MG/ML
4 INJECTION INTRAMUSCULAR; INTRAVENOUS ONCE AS NEEDED
Status: DISCONTINUED | OUTPATIENT
Start: 2018-09-24 | End: 2018-09-24 | Stop reason: HOSPADM

## 2018-09-24 RX ORDER — NEOSTIGMINE METHYLSULFATE 1 MG/ML
INJECTION, SOLUTION INTRAVENOUS
Status: DISCONTINUED | OUTPATIENT
Start: 2018-09-24 | End: 2018-09-24

## 2018-09-24 RX ORDER — FENTANYL CITRATE 50 UG/ML
25 INJECTION, SOLUTION INTRAMUSCULAR; INTRAVENOUS EVERY 5 MIN PRN
Status: DISCONTINUED | OUTPATIENT
Start: 2018-09-24 | End: 2018-09-24 | Stop reason: HOSPADM

## 2018-09-24 RX ADMIN — CEFAZOLIN 2 G: 330 INJECTION, POWDER, FOR SOLUTION INTRAMUSCULAR; INTRAVENOUS at 01:09

## 2018-09-24 RX ADMIN — SODIUM CHLORIDE: 0.9 INJECTION, SOLUTION INTRAVENOUS at 01:09

## 2018-09-24 RX ADMIN — DEXAMETHASONE SODIUM PHOSPHATE 4 MG: 4 INJECTION, SOLUTION INTRAMUSCULAR; INTRAVENOUS at 01:09

## 2018-09-24 RX ADMIN — MIDAZOLAM HYDROCHLORIDE 2 MG: 1 INJECTION, SOLUTION INTRAMUSCULAR; INTRAVENOUS at 01:09

## 2018-09-24 RX ADMIN — PHENYLEPHRINE HYDROCHLORIDE 100 MCG: 10 INJECTION INTRAVENOUS at 02:09

## 2018-09-24 RX ADMIN — GLYCOPYRROLATE 0.6 MG: 0.2 INJECTION, SOLUTION INTRAMUSCULAR; INTRAVENOUS at 02:09

## 2018-09-24 RX ADMIN — PHENYLEPHRINE HYDROCHLORIDE 200 MCG: 10 INJECTION INTRAVENOUS at 02:09

## 2018-09-24 RX ADMIN — ONDANSETRON 4 MG: 2 INJECTION INTRAMUSCULAR; INTRAVENOUS at 01:09

## 2018-09-24 RX ADMIN — ROCURONIUM BROMIDE 40 MG: 10 INJECTION, SOLUTION INTRAVENOUS at 01:09

## 2018-09-24 RX ADMIN — FENTANYL CITRATE 200 MCG: 50 INJECTION, SOLUTION INTRAMUSCULAR; INTRAVENOUS at 01:09

## 2018-09-24 RX ADMIN — SODIUM CHLORIDE, SODIUM GLUCONATE, SODIUM ACETATE, POTASSIUM CHLORIDE, MAGNESIUM CHLORIDE, SODIUM PHOSPHATE, DIBASIC, AND POTASSIUM PHOSPHATE: .53; .5; .37; .037; .03; .012; .00082 INJECTION, SOLUTION INTRAVENOUS at 02:09

## 2018-09-24 RX ADMIN — LIDOCAINE HYDROCHLORIDE 100 MG: 20 INJECTION, SOLUTION INTRAVENOUS at 01:09

## 2018-09-24 RX ADMIN — PROPOFOL 200 MG: 10 INJECTION, EMULSION INTRAVENOUS at 01:09

## 2018-09-24 RX ADMIN — NEOSTIGMINE METHYLSULFATE 4 MG: 1 INJECTION INTRAVENOUS at 02:09

## 2018-09-24 RX ADMIN — ACETAMINOPHEN 1000 MG: 10 INJECTION, SOLUTION INTRAVENOUS at 02:09

## 2018-09-24 NOTE — DISCHARGE INSTRUCTIONS
Recovery After Procedural Sedation (Adult)  You have been given medicine by vein to make you sleep during your surgery. This may have included both a pain medicine and sleeping medicine. Most of the effects have worn off. But you may still have some drowsiness for the next 6 to 8 hours.  Home care  Follow these guidelines when you get home:  · For the next 8 hours, you should be watched by a responsible adult. This person should make sure your condition is not getting worse.  · Don't drink any alcohol for the next 24 hours.  · Don't drive, operate dangerous machinery, or make important business or personal decisions during the next 24 hours.  Note: Your healthcare provider may tell you not to take any medicine by mouth for pain or sleep in the next 4 hours. These medicines may react with the medicines you were given in the hospital. This could cause a much stronger response than usual.  Follow-up care  Follow up with your healthcare provider if you are not alert and back to your usual level of activity within 12 hours.  When to seek medical advice  Call your healthcare provider right away if any of these occur:  · Drowsiness gets worse  · Weakness or dizziness gets worse  · Repeated vomiting  · You can't be awakened   Date Last Reviewed: 10/18/2016  © 4668-2507 [employer]. [address] [place] [address], [place] [address]. All rights reserved. This information is not intended as a substitute for professional medical care. Always follow your healthcare professional's instructions.

## 2018-09-24 NOTE — ANESTHESIA PREPROCEDURE EVALUATION
09/24/2018  Ruben Covington is a 52 y.o., male.    Anesthesia Evaluation         Review of Systems  Anesthesia Hx:  No problems with previous Anesthesia   Social:  Smoker    Cardiovascular:   Exercise tolerance: good Denies Hypertension.  Denies CAD.     Denies Angina.  Functional Capacity Can you climb two flights of stairs? ==> Yes    Pulmonary:   Denies Asthma.  Denies Recent URI.  Denies Sleep Apnea.    Renal/:  Renal/ Normal     Hepatic/GI:   Denies PUD. Denies Hiatal Hernia.  Denies GERD. Denies Liver Disease.  Denies Hepatitis.    Neurological:   Denies CVA. Denies Seizures.    Endocrine:   Denies Diabetes. Denies Hypothyroidism.        Physical Exam  General:  Well nourished    Airway/Jaw/Neck:  Airway Findings: Mouth Opening: Normal Tongue: Normal  General Airway Assessment: Adult  Mallampati: I  TM Distance: Normal, at least 6 cm  Jaw/Neck Findings:  Neck ROM: Normal ROM  Neck Findings:     Eyes/Ears/Nose:  EYES/EARS/NOSE FINDINGS: Normal   Dental:  Dental Findings: Periodontal disease, Severe   Chest/Lungs:  Chest/Lungs Findings: Clear to auscultation     Heart/Vascular:  Heart Findings: Rate: Normal  Rhythm: Regular Rhythm  Sounds: Normal        Mental Status:  Mental Status Findings:  Alert and Oriented         Anesthesia Plan  Type of Anesthesia, risks & benefits discussed:  Anesthesia Type:  general  Patient's Preference: Proceed with anesthesia understanding that the risks are very small but could be serious or life threatening.  Intra-op Monitoring Plan: standard ASA monitors  Intra-op Monitoring Plan Comments:   Post Op Pain Control Plan:   Post Op Pain Control Plan Comments:   Induction:   IV  Beta Blocker:  Patient is not currently on a Beta-Blocker (No further documentation required).       Informed Consent: Patient understands risks and agrees with Anesthesia plan.  Questions  answered. Anesthesia consent signed with patient.  ASA Score: 2     Day of Surgery Review of History & Physical: I have interviewed and examined the patient. I have reviewed the patient's H&P dated:            Ready For Surgery From Anesthesia Perspective.

## 2018-09-24 NOTE — BRIEF OP NOTE
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]     * Brow ptosis [L90.8]    Procedure(s) (LRB):  INSERTION, EYELID PLATINUM WEIGHT (Left)  BROW LIFT, DIRECT (Left)    Anesthesia: General    Description of the findings of the procedure: Gold weight implant of OS, 1.6g    Findings/Key Components: see op note    Estimated Blood Loss: * No values recorded between 9/24/2018  2:00 PM and 9/24/2018  3:07 PM *         Specimens:   Specimen (12h ago, onward)    None          Discharge Note    SUMMARY     Admit Date: 9/24/2018    Discharge Date and Time:  09/24/2018 3:12 PM    Hospital Course: Following completion of an electively scheduled procedure, the patient was transferred to the PACU for postoperative monitoring. The post operative course was uneventful and noted for adequate pain control and PO intake following surgery. The patient is discharged home in good condition and will follow-up with Dr. 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 for 10 days     ondansetron 4 MG Tbdl  Commonly known as:  ZOFRAN-ODT  Take  2 tablets (8 mg total) by mouth every 12 (twelve) hours as needed.        CHANGE how you take these medications    HYDROcodone-acetaminophen 5-325 mg per tablet  Commonly known as:  NORCO  Take 1 tablet by mouth every 6 (six) hours as needed for Pain.  What changed:  reasons to take this        CONTINUE taking these medications    artificial tears 0.5 % ophthalmic solution  Commonly known as:  ISOPTO TEARS  Place 2 drops into the left eye 4 (four) times daily.     bacitracin 500 unit/gram ointment  Apply topically 3 (three) times daily.     tobramycin-dexamethasone 0.3-0.1% 0.3-0.1 % Oint  Commonly known as:  TOBRADEX  Place into the left eye 3 (three) times daily. Apply to left eye INCISION     white petrolatum-mineral oil 94-3 % Oint  Place into the left eye every evening. Place in left eye before bed        STOP taking these medications    amoxicillin-clavulanate 875-125mg 875-125 mg per tablet  Commonly known as:  AUGMENTIN          Discharge Procedure Orders   Diet Adult Regular     Lifting restrictions   Order Comments: No lifting anything heavier than a gallon of milk     Notify your health care provider if you experience any of the following:  temperature >100.4     Notify your health care provider if you experience any of the following:  persistent nausea and vomiting or diarrhea     Notify your health care provider if you experience any of the following:  severe uncontrolled pain     Notify your health care provider if you experience any of the following:  redness, tenderness, or signs of infection (pain, swelling, redness, odor or green/yellow discharge around incision site)     Notify your health care provider if you experience any of the following:  difficulty breathing or increased cough     Notify your health care provider if you experience any of the following:  severe persistent headache     Notify your health care provider if you experience any of the following:  worsening rash     Notify your  health care provider if you experience any of the following:  persistent dizziness, light-headedness, or visual disturbances     Notify your health care provider if you experience any of the following:  increased confusion or weakness     No dressing needed     Activity as tolerated     Follow-up Information     Eduardo Ordoñez MD In 1 week.    Specialty:  Otolaryngology  Why:  For suture removal  Contact information:  Tiffany [address]  [facility] [address]  [phone]

## 2018-09-24 NOTE — INTERVAL H&P NOTE
The patient has been examined and the H&P has been reviewed:    I concur with the findings and no changes have occurred since H&P was written.    Anesthesia/Surgery risks, benefits and alternative options discussed and understood by patient/family.    OR today for left brow lift, platinum weight, and possible tarsal strip.      Active Hospital Problems    Diagnosis  POA    Lagophthalmos of left eye [H02.206]  Yes      Resolved Hospital Problems   No resolved problems to display.

## 2018-09-24 NOTE — ANESTHESIA POSTPROCEDURE EVALUATION
"Anesthesia Post Evaluation    Patient: Ruben Covington    Procedure(s) Performed: Procedure(s) (LRB):  INSERTION, EYELID PLATINUM WEIGHT (Left)  BROW LIFT, DIRECT (Left)    Final Anesthesia Type: general  Patient location during evaluation: PACU  Patient participation: Yes- Able to Participate  Level of consciousness: awake and alert and oriented  Post-procedure vital signs: reviewed and stable  Pain management: adequate  Airway patency: patent  PONV status at discharge: No PONV  Anesthetic complications: no      Cardiovascular status: blood pressure returned to baseline, hemodynamically stable and stable  Respiratory status: unassisted, room air and spontaneous ventilation  Hydration status: euvolemic  Follow-up not needed.        Visit Vitals  /75   Pulse 71   Temp 36.7 °C (98.1 °F) (Temporal)   Resp (!) 22   Ht 5' 7" (1.702 m)   Wt 79.8 kg (176 lb)   SpO2 98%   BMI 27.57 kg/m²       Pain/Karl Score: Pain Assessment Performed: Yes (9/24/2018  4:05 PM)  Presence of Pain: denies (9/24/2018  4:05 PM)  Karl Score: 10 (9/24/2018  4:05 PM)        "

## 2018-09-24 NOTE — TRANSFER OF CARE
"Anesthesia Transfer of Care Note    Patient: Ruben Covington    Procedure(s) Performed: Procedure(s) (LRB):  INSERTION, EYELID PLATINUM WEIGHT (Left)  BROW LIFT, DIRECT (Left)    Patient location: [facility]    Anesthesia Type: general    Transport from OR: Transported from OR on 6-10 L/min O2 by face mask with adequate spontaneous ventilation    Post pain: adequate analgesia    Post assessment: no apparent anesthetic complications    Post vital signs: stable    Level of consciousness: awake, alert and oriented    Nausea/Vomiting: no nausea/vomiting    Complications: none    Transfer of care protocol was followed      Last vitals:   Visit Vitals  /72 (BP Location: Left arm, Patient Position: Lying)   Pulse 88   Temp 37.2 °C (98.9 °F) (Oral)   Resp 20   Ht 5' 7" (1.702 m)   Wt 79.8 kg (176 lb)   SpO2 98%   BMI 27.57 kg/m²     "

## 2018-10-01 ENCOUNTER — PATIENT MESSAGE (OUTPATIENT)
Dept: OTOLARYNGOLOGY | Facility: CLINIC | Age: 53
End: 2018-10-01

## 2018-10-02 ENCOUNTER — OFFICE VISIT (OUTPATIENT)
Dept: OTOLARYNGOLOGY | Facility: CLINIC | Age: 53
End: 2018-10-02
Payer: COMMERCIAL

## 2018-10-02 VITALS
WEIGHT: 179.69 LBS | SYSTOLIC BLOOD PRESSURE: 115 MMHG | DIASTOLIC BLOOD PRESSURE: 69 MMHG | BODY MASS INDEX: 28.14 KG/M2 | HEART RATE: 83 BPM

## 2018-10-02 DIAGNOSIS — H02.234 PARALYTIC LAGOPHTHALMOS OF LEFT UPPER EYELID: ICD-10-CM

## 2018-10-02 DIAGNOSIS — G51.0 FACIAL NERVE PARALYSIS: Primary | ICD-10-CM

## 2018-10-02 PROCEDURE — 99024 POSTOP FOLLOW-UP VISIT: CPT | Mod: S$GLB,,, | Performed by: OTOLARYNGOLOGY

## 2018-10-02 PROCEDURE — 99999 PR PBB SHADOW E&M-EST. PATIENT-LVL III: CPT | Mod: PBBFAC,,, | Performed by: OTOLARYNGOLOGY

## 2018-10-02 NOTE — PROGRESS NOTES
HEAD AND NECK SURGICAL ONCOLOGY CLINIC NOTE    CC: F/U platinum weight    TREATMENT HISTORY:  1) 7/10/2018 - 1.4g platinum weight to upper lid OS, facial nerve repair with AxoGen cable graft.  2) 9/24/2018 - 1.6g platinum weight placed in upper lid, browpexy    INTERVAL HISTORY:Ruben Covington returns to the [facility] for follow-up of platinum weight. No complaints today.Denies dysphagia, odynophagia, throat pain and otalgia.Voice is stable. Does not experience dry mouth. Denies fevers, chills, and nightsweats. There has not been any redness or drainage from the wound.    Exam:  Incision C/D/I, no hematoma  Complete eye closure, no lagophthalmos  Brow overcorrected as planned, sutures removed    A/P: Continue eye patch at night. Brow and eyelid will reposition gradually over next 2-3 weeks  RTC 3 weeks.

## 2021-12-06 ENCOUNTER — PATIENT MESSAGE (OUTPATIENT)
Dept: RESEARCH | Facility: HOSPITAL | Age: 56
End: 2021-12-06
Payer: COMMERCIAL

## (undated) DEVICE — SUCTION SURGICAL FRAZR

## (undated) DEVICE — STAPLER SKIN REGULAR

## (undated) DEVICE — BIT DRILL TUBING

## (undated) DEVICE — BLADE SCALP OPTHL NDL TIP 3MM

## (undated) DEVICE — GAUZE SPONGE 4X4 12PLY

## (undated) DEVICE — BLADE SURGICAL 15C

## (undated) DEVICE — ELECTRODE NEEDLE 2.8IN

## (undated) DEVICE — SUTURE PDS II 3-0 PS-2 CLR

## (undated) DEVICE — SOL IRR NACL .9% 3000ML

## (undated) DEVICE — Device

## (undated) DEVICE — SPONGE DERMACEA GAUZE 4X4

## (undated) DEVICE — CATH IV INTROCAN 18G X 1 1/4

## (undated) DEVICE — SOL IRR WATER STRL 3000 ML

## (undated) DEVICE — CUP MEDICINE STERILE 2OZ

## (undated) DEVICE — SUT CHR GUT 6-0 G-1 18 IN

## (undated) DEVICE — SUT SILK 6-0 BLK BR P-1 P-1

## (undated) DEVICE — CLOSURE SKIN STERI STRIP 1/8X3

## (undated) DEVICE — SUT BONE WAX 2.5 GRMS 12/BX

## (undated) DEVICE — BLADE SCALP OPHTL RND TIP

## (undated) DEVICE — ADHESIVE DERMABOND ADVANCED

## (undated) DEVICE — NDL HYPO 27G X 1 1/2

## (undated) DEVICE — ELECTRODE REM PLYHSV RETURN 9

## (undated) DEVICE — SYR B-D DISP CONTROL 10CC100/C

## (undated) DEVICE — BLADE SURG CARBON STEEL SZ11

## (undated) DEVICE — DENTAL ROLL 1 1/2 X 3/8

## (undated) DEVICE — SEE MEDLINE ITEM 146373

## (undated) DEVICE — DRESSING N ADH OIL EMUL 3X3

## (undated) DEVICE — KIT ANTIFOG

## (undated) DEVICE — NDL SPINAL SPINOCAN 22GX3.5

## (undated) DEVICE — CLIPPER BLADE MOD 4406 (CAREF)

## (undated) DEVICE — KIT DRESS GLASSCK EAR ADLT ST

## (undated) DEVICE — SHEET EENT SPLIT

## (undated) DEVICE — TRAY ENT 4/CS

## (undated) DEVICE — BANDAGE DERMACEA STRETCH 4X1IN

## (undated) DEVICE — BURR STEEL ROUND E9000 2X48MM

## (undated) DEVICE — KIT SUCTION IRRIGATION

## (undated) DEVICE — SUT 5-0 PROLENE

## (undated) DEVICE — SUT 3/0 18IN COATED VICRYLP

## (undated) DEVICE — BURR LSO ROUND FLUTED 5MM

## (undated) DEVICE — NDL HYPO A BEVEL 30X1/2

## (undated) DEVICE — PENCIL ROCKER SWITCH 10FT CORD

## (undated) DEVICE — DRAIN CHANNEL ROUND 10FR

## (undated) DEVICE — SUT 6/0 18IN NUROLON BLK BR

## (undated) DEVICE — KIT EAR EAOFE

## (undated) DEVICE — SUT PROLENE 7-0 P-6

## (undated) DEVICE — SEE MEDLINE ITEM 157194

## (undated) DEVICE — SPONGE DERMACEA 4X4IN 12PLY

## (undated) DEVICE — INSTRUMENT SURG SUCT FRZR W/C

## (undated) DEVICE — SEE MEDLINE ITEM 157117

## (undated) DEVICE — BLADE OTOLOGY BEAVER 5X84MM

## (undated) DEVICE — SEE MEDLINE ITEM 146372

## (undated) DEVICE — SKINMARKER & RULER REGULAR X-F

## (undated) DEVICE — ELECTRODE NDL

## (undated) DEVICE — SEE MEDLINE ITEM 152622

## (undated) DEVICE — SYR SLIP TIP 1CC

## (undated) DEVICE — BURR CUTT CARB 3X38 E9000 FLUT

## (undated) DEVICE — WARMER DRAPE STERILE LF

## (undated) DEVICE — SEE MEDLINE ITEM 146313

## (undated) DEVICE — SEE MEDLINE ITEM 157128